# Patient Record
Sex: MALE | Race: WHITE | NOT HISPANIC OR LATINO | Employment: OTHER | URBAN - METROPOLITAN AREA
[De-identification: names, ages, dates, MRNs, and addresses within clinical notes are randomized per-mention and may not be internally consistent; named-entity substitution may affect disease eponyms.]

---

## 2017-07-24 ENCOUNTER — TELEPHONE (OUTPATIENT)
Dept: CARDIOLOGY | Facility: CLINIC | Age: 62
End: 2017-07-24

## 2017-07-24 NOTE — TELEPHONE ENCOUNTER
"Kasia transferred to this RN as she is trying to make a revisit appt for Ray. She states he is having intermittent CP \"for awhile\" +diaphoresis, +fatigue and weakness. States she almost took him to ER yesterday but \"he's stubborn, he didn't want to and we are still paying off his first heart attack.\" \"He's supposed to go lift january today for work\" I advised against this and advised ER for active chest pain is safest route of care for patient. She states he hates going in to any medical appointments and wants all things done at one visit, does not want to keep coming back. I again advised ER for active CP is the best, safest route for patient to make sure he is not in imminent danger. Delay of care may result in death. She states he will refuse but wants to make cardiology clinic appt. I made BRODERICK appointment Wednesday am with KYLE Peña. She states she will bring him to ER if \"he gets too bad\" between now and then. Mary Beck, RN Cardiology at [facility] July 24, 2017, 9:21 AM  "

## 2017-07-26 ENCOUNTER — OFFICE VISIT (OUTPATIENT)
Dept: CARDIOLOGY | Facility: CLINIC | Age: 62
End: 2017-07-26
Payer: COMMERCIAL

## 2017-07-26 DIAGNOSIS — I20.0 UNSTABLE ANGINA PECTORIS (H): ICD-10-CM

## 2017-07-26 DIAGNOSIS — I25.10 CORONARY ARTERY DISEASE INVOLVING NATIVE CORONARY ARTERY OF NATIVE HEART, ANGINA PRESENCE UNSPECIFIED: Primary | ICD-10-CM

## 2017-07-26 PROCEDURE — 99215 OFFICE O/P EST HI 40 MIN: CPT | Performed by: NURSE PRACTITIONER

## 2017-07-26 RX ORDER — ISOSORBIDE MONONITRATE 30 MG/1
30 TABLET, EXTENDED RELEASE ORAL DAILY
Qty: 30 TABLET | Refills: 3 | Status: SHIPPED | OUTPATIENT
Start: 2017-07-26 | End: 2017-07-26

## 2017-07-26 RX ORDER — NITROGLYCERIN 0.4 MG/1
TABLET SUBLINGUAL
Qty: 25 TABLET | Refills: 3 | Status: SHIPPED | OUTPATIENT
Start: 2017-07-26

## 2017-07-26 RX ORDER — NITROGLYCERIN 0.4 MG/1
TABLET SUBLINGUAL
Qty: 25 TABLET | Refills: 3 | Status: SHIPPED | OUTPATIENT
Start: 2017-07-26 | End: 2017-07-26

## 2017-07-26 RX ORDER — ISOSORBIDE MONONITRATE 30 MG/1
30 TABLET, EXTENDED RELEASE ORAL DAILY
Qty: 30 TABLET | Refills: 3 | Status: SHIPPED | OUTPATIENT
Start: 2017-07-26 | End: 2018-04-03

## 2017-07-26 NOTE — PATIENT INSTRUCTIONS
Thank you for your [facility] visit today. Your provider has recommended the following:  Medication Changes:   1. START Imdur (Isosorbide Mononitrate) 30 mg every morning.  2. Use Nitroglycerin under your tongue as needed for chest pain rated 5/10 or greater.  Recommendations:  1. Cardiac Catheterization (angiogram) to be done at [facility] on: Friday, July 28th. Arrive at: 11:00 am.  If you need to contact [facility] for any reason, please call [phone]. Follow the instructions below.  Follow-up:  See Suyapa Nava NP for cardiology follow up in [place], [place] on: Thursday, August 3rd at 10:20 am to discuss the result and your plan of care going forward.  We kindly ask that you call cardiology scheduling at [phone] three months prior to requested revisit date to schedule future cardiology appointments.  Reminder:  1. Please bring in your current medication list or your medication, over the counter supplements and vitamin bottles as we will review these at each office visit.               [facility] HEART [facility]~[address]~[place], [place]~[address]  Questions about your visit today?  Call your Cardiology Clinic RN's-Sowmya Beck and/or Chasity Sanchez at [phone].  ANGIOGRAM  [facility]   [place], [place]   Contact [facility] scheduling if needed at [phone], Option#2 or [phone].      1. In preparation for your procedure, we require that you do the following:    a. Nothing to eat or drink after midnight if your procedure is before 12 noon.  b. Take your usual morning medications with a small sip of water on the day of your procedure unless you are on the following medications:    Aspirin:  o If you currently take Aspirin, continue taking your same dose.    Coumadin or Warfarin:  o Stop Coumadin or Warfarin on:  __________________________    Pradaxa or Xarelto:  o Stop Pradaxa or Xarelto on:   "______________________________    Diabetic:  o If you take Glucophage (metformin) do not take the morning of the procedure or for 2 days after the procedure. Contact your Primary Care MD regarding glucose control for the days you do not take Glucophage.  o If you are on other oral diabetic medications do not take on the morning of the procedure.  o If you take Insulin contact your Primary Care MD for the recommended dosage to take the morning of the procedure.    Diuretic (Water Pill):  o If you take a diuretic (water pill), do not take the morning of the procedure.    c. If you have an allergy to dye, please contact the [facility] office 4 days before your procedure at [phone] option 2, and ask for a nurse.    2. You will be unable to drive after your procedure; please arrange to have someone drive you home the day of your procedure. You will also need to make sure that there is a responsible adult with you for 24 hours after your procedure. Your procedure will be cancelled if you do not have transportation home or someone to stay with you for 24 hrs.     3. Your procedure will be done at [facility]. Please park in the  Skyway Ramp  on the west side of [facility] on [address]. Take the skyway over [facility] to the hospital. Please check in on the first floor, \"Skyway Welcome Desk\" which is one floor down from the skyway level.     4. If you have any questions about your upcoming procedure, please contact the nurse at [facility] at [phone] or the nurse at [facility] at [phone], option#2.      "

## 2017-07-26 NOTE — LETTER
7/26/2017    Elsa Hutchinson MD  [facility]   [address] [place] [address]    RE: Aries Hilario       Dear Colleague,    I had the pleasure of seeing Aries Hilario in the [facility].    Cardiology Clinic Progress Note  Aries Hilario MRN# [id number]   YOB: 1955 Age: 62 year old     Reason for visit: Chest pain            Assessment and Plan:     1. Chest pain, angina in the setting of known coronary artery disease and a previous previous myocardial infarction.    Chest pain symptoms reminiscent of prior to his non-STEMI in September 2016 where he underwent a drug stent for 100% thrombotic occlusion of his mid left circumflex.  Residual disease in the LAD, RCA, diagonals and OM1    Staffed patient with Dr. Shepard and he recommended direct admission to the emergency department and transfer to [facility] for coronary angiography.  Patient was not agreeable to this plan, but was agreeable to undergo an outpatient angiogram later this week.  Informed patient that if rest symptoms return that he present to the emergency department.  Unfortunately, I'm not confident that he will heed this advice.    Started on Imdur 30 mg daily and a prescription for sublingual nitroglycerin provided    2. History of GI bleed due to H pylori and gastric ulcers on dual antiplatelet therapy    He completed the H. pylori elimination therapy    Has resumed dual antiplatelet therapy with aspirin and Plavix without any issue at this time    3. Hyperlipidemia, LDL goal less than 70    Last LDL in October 2016 was 161    Previous memory issues with Crestor    Currently tolerating Lipitor 40 mg daily.         History of Presenting Illness:    Aries Hilario is a 62 year old patient of Dr. Shepard who presents today to [facility] cardiology clinic at the urging of his significant other Kasia.  She called the clinic earlier this week stating that he was having intermittent chest pain  associated diaphoresis, fatigue and weakness.  She was advised that he should be seen in the emergency department, but he adamantly refused. He was agreeable to be seen in clinic today.  They have financial concerns over his previous hospitalization last fall which assist in deterring him from seeking medical care.    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  He states that he's had symptoms of chest discomfort, fatigue and shortness of breath intermittently over the last two months, but on Friday last week his symptoms escalated.  He stated that he became quite diaphoretic with activity and developed left sided chest pain that radiated up to his neck and down his left arm while working outside on a very hot day.  He was able to get into air conditioning, but his diaphoresis and chest discomfort not improve after resting.  He also had associated numbness and tingling in his left arm and hand.  He has became increasingly fatigued over the last several months. His fatigue increased over the weekend, and he had to spend most of the weekend indoors napping specifically after eating.  Fatigue was on it is concerning symptoms prior to his MI in September 2016    Discussed patient with Dr. Shepard who is his primary cardiologist, and he recommended that the patient be sent to the emergency department due for unstable angina.  Discussed this option with the patient and his significant other, Kasia.  He was not agreeable to be seen in the emergency department, but with some urging is willing to proceed with coronary angiography later this week.  Sublingual nitroglycerin as well as Imdur were provided to the patient.    His past medical history is significant for coronary artery disease and a non-STEMI status post coronary angiography on 9/2/2016.  He was found to have a 100% occlusion of his mid left circumflex artery and underwent a drug-eluting stent at that time.  He was placed on  Brillinta and aspirin but due to financial concerns was transitioned to Plavix.  He had residual disease including the proximal LAD having mild luminal irregularities, mid LAD 30% stenosis proximal D2, distal LAD mild diffuse disease, D1 had 40% ostial stenosis followed by mild disease in D2 had a 40% midsegment stenosis.  There is also a 80% stenosis of the OM1 in the ostial and proximal segments and mid RCA had 30% stenosis.      He also has a past medical history significant for hyperlipidemia, H. pylori, GI bleeding on dual anti platelet therapy secondary to H. pylori and gastric ulcers, mild traumatic brain injury, peripheral neuropathy, anxiety, GERD and hepatitis C in remission.    Unfortunately, in October he was admitted to [facility] for GI bleed.  Endoscopy will demonstrated multiple gastric ulcers.  He was placed on PPI therapy and his aspirin was transiently held.  He has been resumed on dual antiplatelet therapy.  He previously attributed memory loss to his statin therapy and discontinued previously.  He was previously on rosuvastatin and now is on atorvastatin which he is tolerating.  Recent benefits of coronary angiography were discussed with the patient in detail today.    Greater than 45 minutes was spent with this patient today on counseling and discussion.          This note was completed in part using Dragon voice recognition software. Although reviewed after completion, some word and grammatical errors may occur       Review of Systems:   See history of present illness.  Was unable to be obtained by the remaining staff due to patient's unwillingness to cooperate.         Physical Exam:     Vitals: There were no vitals taken for this visit. Blood pressure was 122/88  Constitutional:  cooperative, alert and oriented, well developed, well nourished, in no acute distress        Skin:  warm and dry to the touch        Head:  normocephalic        Eyes:  pupils equal and round;conjunctivae and  lids unremarkable        ENT:  no pallor or cyanosis        Chest:  clear to auscultation;normal symmetry        Cardiac: regular rhythm;normal S1 and S2;no murmurs, gallops or rubs detected                  Abdomen:  abdomen soft obese      Extremities and Back:  no edema        Neurological:  no gross motor deficits;affect appropriate                 Medications:     Current Outpatient Prescriptions   Medication Sig Dispense Refill     nitroGLYcerin (NITROSTAT) 0.4 MG sublingual tablet For chest pain place 1 tablet under the tongue every 5 minutes for 3 doses. If symptoms persist 5 minutes after 1st dose call 911. 25 tablet 3     isosorbide mononitrate (IMDUR) 30 MG 24 hr tablet Take 1 tablet (30 mg) by mouth daily 30 tablet 3     atorvastatin (LIPITOR) 40 MG tablet Take 1 tablet (40 mg) by mouth daily 90 tablet 3     Ferrous Sulfate (IRON SUPPLEMENT PO) Take 325 mg by mouth daily       Probiotic Product (PROBIOTIC ADVANCED PO)        hydrocortisone (ANUSOL-HC) 2.5 % rectal cream Place rectally 2 times daily For up to one week 28.35 g 0     aspirin 81 MG tablet Take 1 tablet (81 mg) by mouth daily 30 tablet 11     oxybutynin (DITROPAN) 5 MG tablet Take 1-2 tablets (5-10 mg) by mouth At Bedtime 60 tablet 3     buPROPion (WELLBUTRIN) 100 MG tablet Take 0.75 tablets (75 mg) by mouth 2 times daily (Patient taking differently: Take 50 mg by mouth daily ) 90 tablet 0     clopidogrel (PLAVIX) 75 MG tablet Take 1 tablet (75 mg) by mouth daily 90 tablet 3     metoprolol (TOPROL-XL) 25 MG 24 hr tablet Take 1/2 tablet by mouth daily. 45 tablet 3     [DISCONTINUED] nitroGLYcerin (NITROSTAT) 0.4 MG sublingual tablet For chest pain place 1 tablet under the tongue every 5 minutes for 3 doses. If symptoms persist 5 minutes after 1st dose call 911. 25 tablet 3     [DISCONTINUED] isosorbide mononitrate (IMDUR) 30 MG 24 hr tablet Take 1 tablet (30 mg) by mouth daily 30 tablet 3           Family History   Problem Relation Age of  Onset     Asthma No family hx of      DIABETES No family hx of      Hypertension No family hx of      CEREBROVASCULAR DISEASE No family hx of      Cancer - colorectal No family hx of      Prostate Cancer No family hx of      Depression Father      suicide     Coronary Artery Disease Brother      MI in his 60s       Social History     Social History     Marital status:      Spouse name: N/A     Number of children: N/A     Years of education: N/A     Occupational History     Not on file.     Social History Main Topics     Smoking status: Former Smoker     Quit date: 1/1/2005     Smokeless tobacco: Never Used     Alcohol use No     Drug use: No     Sexual activity: Yes     Partners: Female     Other Topics Concern     Parent/Sibling W/ Cabg, Mi Or Angioplasty Before 65f 55m? No     Social History Narrative            Past Medical History:     Past Medical History:   Diagnosis Date     Depression with anxiety      Hepatitis C      Peripheral neuropathy (H)               Past Surgical History:     Past Surgical History:   Procedure Laterality Date     COLONOSCOPY N/A 9/29/2014    Procedure: COLONOSCOPY;  Surgeon: Néstor Steele MD;  Location: [facility]     HAND SURGERY       LAPAROSCOPIC HERNIORRHAPHY INGUINAL BILATERAL Bilateral 12/1/2014    Procedure: LAPAROSCOPIC HERNIORRHAPHY INGUINAL BILATERAL;  Surgeon: Navid Almendarez MD;  Location: [facility]              Allergies:   Penicillins       Data:   All laboratory data reviewed:    LAST CHOLESTEROL:  Lab Results   Component Value Date    CHOL 222 12/19/2016     Lab Results   Component Value Date    HDL 42 12/19/2016     Lab Results   Component Value Date     12/19/2016     Lab Results   Component Value Date    TRIG 97 12/19/2016     Lab Results   Component Value Date    CHOLHDLRATIO 5.4 11/21/2014       LAST BMP:  Lab Results   Component Value Date     10/17/2016      Lab Results   Component Value Date    POTASSIUM 3.9 10/17/2016     Lab Results    Component Value Date    CHLORIDE 104 10/17/2016     Lab Results   Component Value Date    TYREE 8.6 10/17/2016     Lab Results   Component Value Date    CO2 27 10/17/2016     Lab Results   Component Value Date    BUN 17 10/17/2016     Lab Results   Component Value Date    CR 1.16 10/17/2016     Lab Results   Component Value Date     10/17/2016       LAST CBC:  Lab Results   Component Value Date    WBC 8.0 10/17/2016     Lab Results   Component Value Date    RBC 3.49 10/17/2016     Lab Results   Component Value Date    HGB 10.7 10/17/2016     Lab Results   Component Value Date    HCT 32.0 10/17/2016     Lab Results   Component Value Date    MCV 92 10/17/2016     Lab Results   Component Value Date    MCH 30.7 10/17/2016     Lab Results   Component Value Date    MCHC 33.4 10/17/2016     Lab Results   Component Value Date    RDW 12.9 10/17/2016     Lab Results   Component Value Date     10/17/2016     Thank you for allowing me to participate in the care of your patient.    Sincerely,     MERCY CORDOBA [facility]

## 2017-07-26 NOTE — PROGRESS NOTES
Cardiology Clinic Progress Note  Aries Hilario MRN# [id number]   YOB: 1955 Age: 62 year old     Reason for visit: Chest pain            Assessment and Plan:     1. Chest pain, angina in the setting of known coronary artery disease and a previous previous myocardial infarction.    Chest pain symptoms reminiscent of prior to his non-STEMI in September 2016 where he underwent a drug stent for 100% thrombotic occlusion of his mid left circumflex.  Residual disease in the LAD, RCA, diagonals and OM1    Staffed patient with Dr. Shepard and he recommended direct admission to the emergency department and transfer to [facility] for coronary angiography.  Patient was not agreeable to this plan, but was agreeable to undergo an outpatient angiogram later this week.  Informed patient that if rest symptoms return that he present to the emergency department.  Unfortunately, I'm not confident that he will heed this advice.    Started on Imdur 30 mg daily and a prescription for sublingual nitroglycerin provided    2. History of GI bleed due to H pylori and gastric ulcers on dual antiplatelet therapy    He completed the H. pylori elimination therapy    Has resumed dual antiplatelet therapy with aspirin and Plavix without any issue at this time    3. Hyperlipidemia, LDL goal less than 70    Last LDL in October 2016 was 161    Previous memory issues with Crestor    Currently tolerating Lipitor 40 mg daily.         History of Presenting Illness:    Aries Hilario is a 62 year old patient of Dr. Shepard who presents today to [facility] cardiology clinic at the urging of his significant other Kasia.  She called the clinic earlier this week stating that he was having intermittent chest pain associated diaphoresis, fatigue and weakness.  She was advised that he should be seen in the emergency department, but he adamantly refused. He was agreeable to be seen in clinic today.  They have financial concerns over his previous  hospitalization last fall which assist in deterring him from seeking medical care.    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  He states that he's had symptoms of chest discomfort, fatigue and shortness of breath intermittently over the last two months, but on Friday last week his symptoms escalated.  He stated that he became quite diaphoretic with activity and developed left sided chest pain that radiated up to his neck and down his left arm while working outside on a very hot day.  He was able to get into air conditioning, but his diaphoresis and chest discomfort not improve after resting.  He also had associated numbness and tingling in his left arm and hand.  He has became increasingly fatigued over the last several months. His fatigue increased over the weekend, and he had to spend most of the weekend indoors napping specifically after eating.  Fatigue was on it is concerning symptoms prior to his MI in September 2016    Discussed patient with Dr. Shepard who is his primary cardiologist, and he recommended that the patient be sent to the emergency department due for unstable angina.  Discussed this option with the patient and his significant other, Kasia.  He was not agreeable to be seen in the emergency department, but with some urging is willing to proceed with coronary angiography later this week.  Sublingual nitroglycerin as well as Imdur were provided to the patient.    His past medical history is significant for coronary artery disease and a non-STEMI status post coronary angiography on 9/2/2016.  He was found to have a 100% occlusion of his mid left circumflex artery and underwent a drug-eluting stent at that time.  He was placed on Brillinta and aspirin but due to financial concerns was transitioned to Plavix.  He had residual disease including the proximal LAD having mild luminal irregularities, mid LAD 30% stenosis proximal D2, distal LAD mild diffuse disease,  D1 had 40% ostial stenosis followed by mild disease in D2 had a 40% midsegment stenosis.  There is also a 80% stenosis of the OM1 in the ostial and proximal segments and mid RCA had 30% stenosis.      He also has a past medical history significant for hyperlipidemia, H. pylori, GI bleeding on dual anti platelet therapy secondary to H. pylori and gastric ulcers, mild traumatic brain injury, peripheral neuropathy, anxiety, GERD and hepatitis C in remission.    Unfortunately, in October he was admitted to [facility] for GI bleed.  Endoscopy will demonstrated multiple gastric ulcers.  He was placed on PPI therapy and his aspirin was transiently held.  He has been resumed on dual antiplatelet therapy.  He previously attributed memory loss to his statin therapy and discontinued previously.  He was previously on rosuvastatin and now is on atorvastatin which he is tolerating.  Recent benefits of coronary angiography were discussed with the patient in detail today.    Greater than 45 minutes was spent with this patient today on counseling and discussion.          This note was completed in part using Dragon voice recognition software. Although reviewed after completion, some word and grammatical errors may occur       Review of Systems:   See history of present illness.  Was unable to be obtained by the remaining staff due to patient's unwillingness to cooperate.         Physical Exam:     Vitals: There were no vitals taken for this visit. Blood pressure was 122/88  Constitutional:  cooperative, alert and oriented, well developed, well nourished, in no acute distress        Skin:  warm and dry to the touch        Head:  normocephalic        Eyes:  pupils equal and round;conjunctivae and lids unremarkable        ENT:  no pallor or cyanosis        Chest:  clear to auscultation;normal symmetry        Cardiac: regular rhythm;normal S1 and S2;no murmurs, gallops or rubs detected                  Abdomen:  abdomen soft  obese      Extremities and Back:  no edema        Neurological:  no gross motor deficits;affect appropriate                 Medications:     Current Outpatient Prescriptions   Medication Sig Dispense Refill     nitroGLYcerin (NITROSTAT) 0.4 MG sublingual tablet For chest pain place 1 tablet under the tongue every 5 minutes for 3 doses. If symptoms persist 5 minutes after 1st dose call 911. 25 tablet 3     isosorbide mononitrate (IMDUR) 30 MG 24 hr tablet Take 1 tablet (30 mg) by mouth daily 30 tablet 3     atorvastatin (LIPITOR) 40 MG tablet Take 1 tablet (40 mg) by mouth daily 90 tablet 3     Ferrous Sulfate (IRON SUPPLEMENT PO) Take 325 mg by mouth daily       Probiotic Product (PROBIOTIC ADVANCED PO)        hydrocortisone (ANUSOL-HC) 2.5 % rectal cream Place rectally 2 times daily For up to one week 28.35 g 0     aspirin 81 MG tablet Take 1 tablet (81 mg) by mouth daily 30 tablet 11     oxybutynin (DITROPAN) 5 MG tablet Take 1-2 tablets (5-10 mg) by mouth At Bedtime 60 tablet 3     buPROPion (WELLBUTRIN) 100 MG tablet Take 0.75 tablets (75 mg) by mouth 2 times daily (Patient taking differently: Take 50 mg by mouth daily ) 90 tablet 0     clopidogrel (PLAVIX) 75 MG tablet Take 1 tablet (75 mg) by mouth daily 90 tablet 3     metoprolol (TOPROL-XL) 25 MG 24 hr tablet Take 1/2 tablet by mouth daily. 45 tablet 3     [DISCONTINUED] nitroGLYcerin (NITROSTAT) 0.4 MG sublingual tablet For chest pain place 1 tablet under the tongue every 5 minutes for 3 doses. If symptoms persist 5 minutes after 1st dose call 911. 25 tablet 3     [DISCONTINUED] isosorbide mononitrate (IMDUR) 30 MG 24 hr tablet Take 1 tablet (30 mg) by mouth daily 30 tablet 3           Family History   Problem Relation Age of Onset     Asthma No family hx of      DIABETES No family hx of      Hypertension No family hx of      CEREBROVASCULAR DISEASE No family hx of      Cancer - colorectal No family hx of      Prostate Cancer No family hx of      Depression  Father      suicide     Coronary Artery Disease Brother      MI in his 60s       Social History     Social History     Marital status:      Spouse name: N/A     Number of children: N/A     Years of education: N/A     Occupational History     Not on file.     Social History Main Topics     Smoking status: Former Smoker     Quit date: 1/1/2005     Smokeless tobacco: Never Used     Alcohol use No     Drug use: No     Sexual activity: Yes     Partners: Female     Other Topics Concern     Parent/Sibling W/ Cabg, Mi Or Angioplasty Before 65f 55m? No     Social History Narrative            Past Medical History:     Past Medical History:   Diagnosis Date     Depression with anxiety      Hepatitis C      Peripheral neuropathy (H)               Past Surgical History:     Past Surgical History:   Procedure Laterality Date     COLONOSCOPY N/A 9/29/2014    Procedure: COLONOSCOPY;  Surgeon: Néstor Steele MD;  Location: [facility]     HAND SURGERY       LAPAROSCOPIC HERNIORRHAPHY INGUINAL BILATERAL Bilateral 12/1/2014    Procedure: LAPAROSCOPIC HERNIORRHAPHY INGUINAL BILATERAL;  Surgeon: Navid Almendarez MD;  Location: [facility]              Allergies:   Penicillins       Data:   All laboratory data reviewed:    LAST CHOLESTEROL:  Lab Results   Component Value Date    CHOL 222 12/19/2016     Lab Results   Component Value Date    HDL 42 12/19/2016     Lab Results   Component Value Date     12/19/2016     Lab Results   Component Value Date    TRIG 97 12/19/2016     Lab Results   Component Value Date    CHOLHDLRATIO 5.4 11/21/2014       LAST BMP:  Lab Results   Component Value Date     10/17/2016      Lab Results   Component Value Date    POTASSIUM 3.9 10/17/2016     Lab Results   Component Value Date    CHLORIDE 104 10/17/2016     Lab Results   Component Value Date    TYREE 8.6 10/17/2016     Lab Results   Component Value Date    CO2 27 10/17/2016     Lab Results   Component Value Date    BUN 17 10/17/2016     Lab  Results   Component Value Date    CR 1.16 10/17/2016     Lab Results   Component Value Date     10/17/2016       LAST CBC:  Lab Results   Component Value Date    WBC 8.0 10/17/2016     Lab Results   Component Value Date    RBC 3.49 10/17/2016     Lab Results   Component Value Date    HGB 10.7 10/17/2016     Lab Results   Component Value Date    HCT 32.0 10/17/2016     Lab Results   Component Value Date    MCV 92 10/17/2016     Lab Results   Component Value Date    MCH 30.7 10/17/2016     Lab Results   Component Value Date    MCHC 33.4 10/17/2016     Lab Results   Component Value Date    RDW 12.9 10/17/2016     Lab Results   Component Value Date     10/17/2016         MARCUS HAYES, [facility], CNP

## 2017-07-27 RX ORDER — LIDOCAINE 40 MG/G
CREAM TOPICAL
Status: CANCELLED | OUTPATIENT
Start: 2017-07-27

## 2017-07-27 RX ORDER — SODIUM CHLORIDE 9 MG/ML
INJECTION, SOLUTION INTRAVENOUS CONTINUOUS
Status: CANCELLED | OUTPATIENT
Start: 2017-07-27

## 2017-07-27 RX ORDER — ASPIRIN 81 MG/1
81 TABLET ORAL DAILY
Status: CANCELLED | OUTPATIENT
Start: 2017-07-27

## 2017-07-28 ENCOUNTER — APPOINTMENT (OUTPATIENT)
Dept: CARDIOLOGY | Facility: CLINIC | Age: 62
End: 2017-07-28
Attending: NURSE PRACTITIONER
Payer: COMMERCIAL

## 2017-07-28 ENCOUNTER — HOSPITAL ENCOUNTER (OUTPATIENT)
Facility: CLINIC | Age: 62
Discharge: HOME | End: 2017-07-28
Attending: INTERNAL MEDICINE | Admitting: INTERNAL MEDICINE
Payer: COMMERCIAL

## 2017-07-28 VITALS
WEIGHT: 259 LBS | OXYGEN SATURATION: 94 % | TEMPERATURE: 97.6 F | SYSTOLIC BLOOD PRESSURE: 152 MMHG | BODY MASS INDEX: 35.13 KG/M2 | RESPIRATION RATE: 16 BRPM | DIASTOLIC BLOOD PRESSURE: 100 MMHG | HEART RATE: 57 BPM

## 2017-07-28 DIAGNOSIS — I25.10 CORONARY ARTERY DISEASE INVOLVING NATIVE CORONARY ARTERY OF NATIVE HEART, ANGINA PRESENCE UNSPECIFIED: ICD-10-CM

## 2017-07-28 LAB
ANION GAP SERPL CALCULATED.3IONS-SCNC: 6 MMOL/L (ref 3–14)
APTT PPP: 29 SEC (ref 22–37)
BUN SERPL-MCNC: 18 MG/DL (ref 7–30)
CALCIUM SERPL-MCNC: 8.8 MG/DL (ref 8.5–10.1)
CHLORIDE SERPL-SCNC: 105 MMOL/L (ref 94–109)
CO2 SERPL-SCNC: 28 MMOL/L (ref 20–32)
CREAT SERPL-MCNC: 0.96 MG/DL (ref 0.66–1.25)
ERYTHROCYTE [DISTWIDTH] IN BLOOD BY AUTOMATED COUNT: 12.8 % (ref 10–15)
GFR SERPL CREATININE-BSD FRML MDRD: 80 ML/MIN/1.7M2
GLUCOSE SERPL-MCNC: 101 MG/DL (ref 70–99)
HCT VFR BLD AUTO: 47.3 % (ref 40–53)
HGB BLD-MCNC: 16.4 G/DL (ref 13.3–17.7)
INR PPP: 0.93 (ref 0.86–1.14)
MCH RBC QN AUTO: 31.2 PG (ref 26.5–33)
MCHC RBC AUTO-ENTMCNC: 34.7 G/DL (ref 31.5–36.5)
MCV RBC AUTO: 90 FL (ref 78–100)
PLATELET # BLD AUTO: 197 10E9/L (ref 150–450)
POTASSIUM SERPL-SCNC: 4 MMOL/L (ref 3.4–5.3)
RBC # BLD AUTO: 5.26 10E12/L (ref 4.4–5.9)
SODIUM SERPL-SCNC: 139 MMOL/L (ref 133–144)
WBC # BLD AUTO: 8.5 10E9/L (ref 4–11)

## 2017-07-28 PROCEDURE — 93458 L HRT ARTERY/VENTRICLE ANGIO: CPT | Mod: 26 | Performed by: INTERNAL MEDICINE

## 2017-07-28 PROCEDURE — 40000065 ZZH STATISTIC EKG NON-CHARGEABLE

## 2017-07-28 PROCEDURE — 93458 L HRT ARTERY/VENTRICLE ANGIO: CPT

## 2017-07-28 PROCEDURE — 99153 MOD SED SAME PHYS/QHP EA: CPT

## 2017-07-28 PROCEDURE — 93010 ELECTROCARDIOGRAM REPORT: CPT | Performed by: INTERNAL MEDICINE

## 2017-07-28 PROCEDURE — 85730 THROMBOPLASTIN TIME PARTIAL: CPT | Performed by: NURSE PRACTITIONER

## 2017-07-28 PROCEDURE — 25000125 ZZHC RX 250: Performed by: INTERNAL MEDICINE

## 2017-07-28 PROCEDURE — 27210910 ZZH NEEDLE CR6

## 2017-07-28 PROCEDURE — 40000852 ZZH STATISTIC HEART CATH LAB OR EP LAB

## 2017-07-28 PROCEDURE — B2111ZZ FLUOROSCOPY OF MULTIPLE CORONARY ARTERIES USING LOW OSMOLAR CONTRAST: ICD-10-PCS | Performed by: INTERNAL MEDICINE

## 2017-07-28 PROCEDURE — 80048 BASIC METABOLIC PNL TOTAL CA: CPT | Performed by: NURSE PRACTITIONER

## 2017-07-28 PROCEDURE — 27210787 ZZH MANIFOLD CR2

## 2017-07-28 PROCEDURE — 85027 COMPLETE CBC AUTOMATED: CPT | Performed by: NURSE PRACTITIONER

## 2017-07-28 PROCEDURE — 25000128 H RX IP 250 OP 636: Performed by: INTERNAL MEDICINE

## 2017-07-28 PROCEDURE — 27210795 ZZH PAD DEFIB QUICK CR4

## 2017-07-28 PROCEDURE — 27210946 ZZH KIT HC TOTES DISP CR8

## 2017-07-28 PROCEDURE — 85610 PROTHROMBIN TIME: CPT | Performed by: NURSE PRACTITIONER

## 2017-07-28 PROCEDURE — 25000128 H RX IP 250 OP 636: Performed by: NURSE PRACTITIONER

## 2017-07-28 PROCEDURE — 25000132 ZZH RX MED GY IP 250 OP 250 PS 637: Performed by: INTERNAL MEDICINE

## 2017-07-28 PROCEDURE — 27211089 ZZH KIT ACIST INJECTOR CR3

## 2017-07-28 PROCEDURE — 4A023N7 MEASUREMENT OF CARDIAC SAMPLING AND PRESSURE, LEFT HEART, PERCUTANEOUS APPROACH: ICD-10-PCS | Performed by: INTERNAL MEDICINE

## 2017-07-28 PROCEDURE — 99152 MOD SED SAME PHYS/QHP 5/>YRS: CPT | Performed by: INTERNAL MEDICINE

## 2017-07-28 PROCEDURE — 99152 MOD SED SAME PHYS/QHP 5/>YRS: CPT

## 2017-07-28 PROCEDURE — 93005 ELECTROCARDIOGRAM TRACING: CPT

## 2017-07-28 RX ORDER — ASPIRIN 81 MG/1
81 TABLET ORAL DAILY
Status: DISCONTINUED | OUTPATIENT
Start: 2017-07-28 | End: 2017-07-28 | Stop reason: HOSPADM

## 2017-07-28 RX ORDER — ATROPINE SULFATE 0.1 MG/ML
.5-1 INJECTION INTRAVENOUS
Status: DISCONTINUED | OUTPATIENT
Start: 2017-07-28 | End: 2017-07-28 | Stop reason: HOSPADM

## 2017-07-28 RX ORDER — FLUMAZENIL 0.1 MG/ML
0.2 INJECTION, SOLUTION INTRAVENOUS
Status: DISCONTINUED | OUTPATIENT
Start: 2017-07-28 | End: 2017-07-28 | Stop reason: HOSPADM

## 2017-07-28 RX ORDER — LIDOCAINE HYDROCHLORIDE 10 MG/ML
1-10 INJECTION, SOLUTION EPIDURAL; INFILTRATION; INTRACAUDAL; PERINEURAL
Status: COMPLETED | OUTPATIENT
Start: 2017-07-28 | End: 2017-07-28

## 2017-07-28 RX ORDER — SODIUM CHLORIDE 9 MG/ML
INJECTION, SOLUTION INTRAVENOUS CONTINUOUS
Status: DISCONTINUED | OUTPATIENT
Start: 2017-07-28 | End: 2017-07-28 | Stop reason: HOSPADM

## 2017-07-28 RX ORDER — PROMETHAZINE HYDROCHLORIDE 25 MG/ML
6.25-25 INJECTION, SOLUTION INTRAMUSCULAR; INTRAVENOUS EVERY 4 HOURS PRN
Status: DISCONTINUED | OUTPATIENT
Start: 2017-07-28 | End: 2017-07-28 | Stop reason: HOSPADM

## 2017-07-28 RX ORDER — CLOPIDOGREL BISULFATE 75 MG/1
75 TABLET ORAL
Status: DISCONTINUED | OUTPATIENT
Start: 2017-07-28 | End: 2017-07-28 | Stop reason: HOSPADM

## 2017-07-28 RX ORDER — ATROPINE SULFATE 0.1 MG/ML
0.5 INJECTION INTRAVENOUS EVERY 5 MIN PRN
Status: DISCONTINUED | OUTPATIENT
Start: 2017-07-28 | End: 2017-07-28 | Stop reason: HOSPADM

## 2017-07-28 RX ORDER — DOPAMINE HYDROCHLORIDE 160 MG/100ML
2-20 INJECTION, SOLUTION INTRAVENOUS CONTINUOUS PRN
Status: DISCONTINUED | OUTPATIENT
Start: 2017-07-28 | End: 2017-07-28 | Stop reason: HOSPADM

## 2017-07-28 RX ORDER — PRASUGREL 10 MG/1
10-60 TABLET, FILM COATED ORAL
Status: DISCONTINUED | OUTPATIENT
Start: 2017-07-28 | End: 2017-07-28 | Stop reason: HOSPADM

## 2017-07-28 RX ORDER — PHENYLEPHRINE HCL IN 0.9% NACL 1 MG/10 ML
20-100 SYRINGE (ML) INTRAVENOUS
Status: DISCONTINUED | OUTPATIENT
Start: 2017-07-28 | End: 2017-07-28 | Stop reason: HOSPADM

## 2017-07-28 RX ORDER — NALOXONE HYDROCHLORIDE 0.4 MG/ML
0.4 INJECTION, SOLUTION INTRAMUSCULAR; INTRAVENOUS; SUBCUTANEOUS EVERY 5 MIN PRN
Status: DISCONTINUED | OUTPATIENT
Start: 2017-07-28 | End: 2017-07-28 | Stop reason: HOSPADM

## 2017-07-28 RX ORDER — FENTANYL CITRATE 50 UG/ML
25-50 INJECTION, SOLUTION INTRAMUSCULAR; INTRAVENOUS
Status: DISCONTINUED | OUTPATIENT
Start: 2017-07-28 | End: 2017-07-28 | Stop reason: HOSPADM

## 2017-07-28 RX ORDER — ENALAPRILAT 1.25 MG/ML
1.25-2.5 INJECTION INTRAVENOUS
Status: DISCONTINUED | OUTPATIENT
Start: 2017-07-28 | End: 2017-07-28 | Stop reason: HOSPADM

## 2017-07-28 RX ORDER — POTASSIUM CHLORIDE 29.8 MG/ML
20 INJECTION INTRAVENOUS
Status: DISCONTINUED | OUTPATIENT
Start: 2017-07-28 | End: 2017-07-28 | Stop reason: HOSPADM

## 2017-07-28 RX ORDER — POTASSIUM CHLORIDE 7.45 MG/ML
10 INJECTION INTRAVENOUS
Status: DISCONTINUED | OUTPATIENT
Start: 2017-07-28 | End: 2017-07-28 | Stop reason: HOSPADM

## 2017-07-28 RX ORDER — DIPHENHYDRAMINE HYDROCHLORIDE 50 MG/ML
25-50 INJECTION INTRAMUSCULAR; INTRAVENOUS
Status: DISCONTINUED | OUTPATIENT
Start: 2017-07-28 | End: 2017-07-28 | Stop reason: HOSPADM

## 2017-07-28 RX ORDER — CLOPIDOGREL BISULFATE 75 MG/1
300-600 TABLET ORAL
Status: DISCONTINUED | OUTPATIENT
Start: 2017-07-28 | End: 2017-07-28 | Stop reason: HOSPADM

## 2017-07-28 RX ORDER — IOPAMIDOL 755 MG/ML
62 INJECTION, SOLUTION INTRAVASCULAR ONCE
Status: COMPLETED | OUTPATIENT
Start: 2017-07-28 | End: 2017-07-28

## 2017-07-28 RX ORDER — EPTIFIBATIDE 2 MG/ML
2 INJECTION, SOLUTION INTRAVENOUS CONTINUOUS PRN
Status: DISCONTINUED | OUTPATIENT
Start: 2017-07-28 | End: 2017-07-28 | Stop reason: HOSPADM

## 2017-07-28 RX ORDER — FUROSEMIDE 10 MG/ML
20-100 INJECTION INTRAMUSCULAR; INTRAVENOUS
Status: DISCONTINUED | OUTPATIENT
Start: 2017-07-28 | End: 2017-07-28 | Stop reason: HOSPADM

## 2017-07-28 RX ORDER — DEXTROSE MONOHYDRATE 25 G/50ML
12.5-5 INJECTION, SOLUTION INTRAVENOUS EVERY 30 MIN PRN
Status: DISCONTINUED | OUTPATIENT
Start: 2017-07-28 | End: 2017-07-28 | Stop reason: HOSPADM

## 2017-07-28 RX ORDER — NALOXONE HYDROCHLORIDE 0.4 MG/ML
.1-.4 INJECTION, SOLUTION INTRAMUSCULAR; INTRAVENOUS; SUBCUTANEOUS
Status: DISCONTINUED | OUTPATIENT
Start: 2017-07-28 | End: 2017-07-28 | Stop reason: HOSPADM

## 2017-07-28 RX ORDER — ACETAMINOPHEN 325 MG/1
325-650 TABLET ORAL EVERY 4 HOURS PRN
Status: DISCONTINUED | OUTPATIENT
Start: 2017-07-28 | End: 2017-07-28 | Stop reason: HOSPADM

## 2017-07-28 RX ORDER — NICARDIPINE HYDROCHLORIDE 2.5 MG/ML
100 INJECTION INTRAVENOUS
Status: DISCONTINUED | OUTPATIENT
Start: 2017-07-28 | End: 2017-07-28 | Stop reason: HOSPADM

## 2017-07-28 RX ORDER — SODIUM NITROPRUSSIDE 25 MG/ML
100-200 INJECTION INTRAVENOUS
Status: DISCONTINUED | OUTPATIENT
Start: 2017-07-28 | End: 2017-07-28 | Stop reason: HOSPADM

## 2017-07-28 RX ORDER — EPTIFIBATIDE 2 MG/ML
180 INJECTION, SOLUTION INTRAVENOUS EVERY 10 MIN PRN
Status: DISCONTINUED | OUTPATIENT
Start: 2017-07-28 | End: 2017-07-28 | Stop reason: HOSPADM

## 2017-07-28 RX ORDER — MORPHINE SULFATE 2 MG/ML
1-2 INJECTION, SOLUTION INTRAMUSCULAR; INTRAVENOUS EVERY 5 MIN PRN
Status: DISCONTINUED | OUTPATIENT
Start: 2017-07-28 | End: 2017-07-28 | Stop reason: HOSPADM

## 2017-07-28 RX ORDER — HYDROCODONE BITARTRATE AND ACETAMINOPHEN 5; 325 MG/1; MG/1
1-2 TABLET ORAL EVERY 4 HOURS PRN
Status: DISCONTINUED | OUTPATIENT
Start: 2017-07-28 | End: 2017-07-28 | Stop reason: HOSPADM

## 2017-07-28 RX ORDER — HEPARIN SODIUM 1000 [USP'U]/ML
1000-10000 INJECTION, SOLUTION INTRAVENOUS; SUBCUTANEOUS EVERY 5 MIN PRN
Status: DISCONTINUED | OUTPATIENT
Start: 2017-07-28 | End: 2017-07-28 | Stop reason: HOSPADM

## 2017-07-28 RX ORDER — LORAZEPAM 2 MG/ML
.5-2 INJECTION INTRAMUSCULAR EVERY 4 HOURS PRN
Status: DISCONTINUED | OUTPATIENT
Start: 2017-07-28 | End: 2017-07-28 | Stop reason: HOSPADM

## 2017-07-28 RX ORDER — LIDOCAINE HYDROCHLORIDE 10 MG/ML
30 INJECTION, SOLUTION EPIDURAL; INFILTRATION; INTRACAUDAL; PERINEURAL
Status: DISCONTINUED | OUTPATIENT
Start: 2017-07-28 | End: 2017-07-28 | Stop reason: HOSPADM

## 2017-07-28 RX ORDER — NITROGLYCERIN 0.4 MG/1
0.4 TABLET SUBLINGUAL EVERY 5 MIN PRN
Status: DISCONTINUED | OUTPATIENT
Start: 2017-07-28 | End: 2017-07-28 | Stop reason: HOSPADM

## 2017-07-28 RX ORDER — ASPIRIN 81 MG/1
81-324 TABLET, CHEWABLE ORAL
Status: DISCONTINUED | OUTPATIENT
Start: 2017-07-28 | End: 2017-07-28 | Stop reason: HOSPADM

## 2017-07-28 RX ORDER — METHYLPREDNISOLONE SODIUM SUCCINATE 125 MG/2ML
125 INJECTION, POWDER, LYOPHILIZED, FOR SOLUTION INTRAMUSCULAR; INTRAVENOUS
Status: DISCONTINUED | OUTPATIENT
Start: 2017-07-28 | End: 2017-07-28 | Stop reason: HOSPADM

## 2017-07-28 RX ORDER — LIDOCAINE 40 MG/G
CREAM TOPICAL
Status: DISCONTINUED | OUTPATIENT
Start: 2017-07-28 | End: 2017-07-28 | Stop reason: HOSPADM

## 2017-07-28 RX ORDER — NALOXONE HYDROCHLORIDE 0.4 MG/ML
.2-.4 INJECTION, SOLUTION INTRAMUSCULAR; INTRAVENOUS; SUBCUTANEOUS
Status: DISCONTINUED | OUTPATIENT
Start: 2017-07-28 | End: 2017-07-28 | Stop reason: HOSPADM

## 2017-07-28 RX ORDER — ADENOSINE 3 MG/ML
12-12000 INJECTION, SOLUTION INTRAVENOUS
Status: DISCONTINUED | OUTPATIENT
Start: 2017-07-28 | End: 2017-07-28 | Stop reason: HOSPADM

## 2017-07-28 RX ORDER — NITROGLYCERIN 5 MG/ML
100-200 VIAL (ML) INTRAVENOUS
Status: DISCONTINUED | OUTPATIENT
Start: 2017-07-28 | End: 2017-07-28 | Stop reason: HOSPADM

## 2017-07-28 RX ORDER — PROTAMINE SULFATE 10 MG/ML
1-5 INJECTION, SOLUTION INTRAVENOUS
Status: DISCONTINUED | OUTPATIENT
Start: 2017-07-28 | End: 2017-07-28 | Stop reason: HOSPADM

## 2017-07-28 RX ORDER — NIFEDIPINE 10 MG/1
10 CAPSULE ORAL
Status: DISCONTINUED | OUTPATIENT
Start: 2017-07-28 | End: 2017-07-28 | Stop reason: HOSPADM

## 2017-07-28 RX ORDER — ONDANSETRON 2 MG/ML
4 INJECTION INTRAMUSCULAR; INTRAVENOUS EVERY 4 HOURS PRN
Status: DISCONTINUED | OUTPATIENT
Start: 2017-07-28 | End: 2017-07-28 | Stop reason: HOSPADM

## 2017-07-28 RX ORDER — ASPIRIN 325 MG
325 TABLET ORAL
Status: DISCONTINUED | OUTPATIENT
Start: 2017-07-28 | End: 2017-07-28 | Stop reason: HOSPADM

## 2017-07-28 RX ORDER — NITROGLYCERIN 20 MG/100ML
.07-1.7 INJECTION INTRAVENOUS CONTINUOUS PRN
Status: DISCONTINUED | OUTPATIENT
Start: 2017-07-28 | End: 2017-07-28 | Stop reason: HOSPADM

## 2017-07-28 RX ORDER — HYDRALAZINE HYDROCHLORIDE 20 MG/ML
10-20 INJECTION INTRAMUSCULAR; INTRAVENOUS
Status: DISCONTINUED | OUTPATIENT
Start: 2017-07-28 | End: 2017-07-28 | Stop reason: HOSPADM

## 2017-07-28 RX ORDER — BUPIVACAINE HYDROCHLORIDE 2.5 MG/ML
1-10 INJECTION, SOLUTION EPIDURAL; INFILTRATION; INTRACAUDAL
Status: DISCONTINUED | OUTPATIENT
Start: 2017-07-28 | End: 2017-07-28 | Stop reason: HOSPADM

## 2017-07-28 RX ORDER — METOPROLOL TARTRATE 1 MG/ML
5 INJECTION, SOLUTION INTRAVENOUS EVERY 5 MIN PRN
Status: DISCONTINUED | OUTPATIENT
Start: 2017-07-28 | End: 2017-07-28 | Stop reason: HOSPADM

## 2017-07-28 RX ORDER — VERAPAMIL HYDROCHLORIDE 2.5 MG/ML
1-2.5 INJECTION, SOLUTION INTRAVENOUS
Status: DISCONTINUED | OUTPATIENT
Start: 2017-07-28 | End: 2017-07-28 | Stop reason: HOSPADM

## 2017-07-28 RX ORDER — PROTAMINE SULFATE 10 MG/ML
25-100 INJECTION, SOLUTION INTRAVENOUS EVERY 5 MIN PRN
Status: DISCONTINUED | OUTPATIENT
Start: 2017-07-28 | End: 2017-07-28 | Stop reason: HOSPADM

## 2017-07-28 RX ORDER — DOBUTAMINE HYDROCHLORIDE 200 MG/100ML
2-20 INJECTION INTRAVENOUS CONTINUOUS PRN
Status: DISCONTINUED | OUTPATIENT
Start: 2017-07-28 | End: 2017-07-28 | Stop reason: HOSPADM

## 2017-07-28 RX ORDER — NITROGLYCERIN 5 MG/ML
100-500 VIAL (ML) INTRAVENOUS
Status: DISCONTINUED | OUTPATIENT
Start: 2017-07-28 | End: 2017-07-28 | Stop reason: HOSPADM

## 2017-07-28 RX ADMIN — NITROGLYCERIN 200 MCG: 5 INJECTION, SOLUTION INTRAVENOUS at 13:22

## 2017-07-28 RX ADMIN — ACETAMINOPHEN 650 MG: 325 TABLET, FILM COATED ORAL at 17:18

## 2017-07-28 RX ADMIN — FENTANYL CITRATE 50 MCG: 50 INJECTION, SOLUTION INTRAMUSCULAR; INTRAVENOUS at 13:21

## 2017-07-28 RX ADMIN — SODIUM CHLORIDE: 9 INJECTION, SOLUTION INTRAVENOUS at 12:01

## 2017-07-28 RX ADMIN — LIDOCAINE HYDROCHLORIDE 100 MG: 10 INJECTION, SOLUTION EPIDURAL; INFILTRATION; INTRACAUDAL; PERINEURAL at 13:09

## 2017-07-28 RX ADMIN — MIDAZOLAM HYDROCHLORIDE 1 MG: 1 INJECTION, SOLUTION INTRAMUSCULAR; INTRAVENOUS at 13:21

## 2017-07-28 RX ADMIN — IOPAMIDOL 62 ML: 755 INJECTION, SOLUTION INTRAVASCULAR at 13:30

## 2017-07-28 NOTE — DISCHARGE INSTRUCTIONS
Cardiac Angiogram Discharge Instructions - Femoral    After you go home:      Have an adult stay with you until tomorrow.    Drink extra fluids for 2 days.    You may resume your normal diet.    No smoking       For 24 hours - due to the sedation you received:    Relax and take it easy.    Do NOT make any important or legal decisions.    Do NOT drive or operate machines at home or at work.    Do NOT drink alcohol.    Care of Groin Puncture Site:      For the first 24 hrs - check the puncture site every 1-2 hours while awake.    For 2 days, when you cough, sneeze, laugh or move your bowels, hold your hand over the puncture site and press firmly.    Remove the bandaid after 24 hours. If there is minor oozing, apply another bandaid and remove it after 12 hours.    It is normal to have a small bruise or pea size lump at the site.    You may shower tomorrow. Do NOT take a bath, or use a hot tub or pool for at least 3 days. Do NOT scrub the site. Do not use lotion or powder near the puncture site.    Activity:            For 2 days:    No stooping or squatting    Do NOT do any heavy activity such as exercise, lifting, or straining.     No housework, yard work or any activity that make you sweat    Do NOT lift more than 10 pounds    Bleeding:      If you start bleeding from the site in your groin, lie down flat and press firmly on/above the site for 10 minutes.     Once bleeding stops, lay flat for 2 hours.     Call [facility] as soon as you can.       Call 911 right away if you have heavy bleeding or bleeding that does not stop.      Medicines:      If you are taking antiplatelet medications such as Plavix, Brilinta or Effient, do not stop taking it until you talk to your cardiologist.      If you are on Metformin (Glucophage) and your GFR (kidney function level) is >30, you may continue taking your Metformin.    If you are on Metformin (Glucophage) and your GFR (kidney function level) is <30, do not restart the Metformin  for 48 hours after your procedure. Check with your primary care giver before restarting the Metformin to see if you need to have blood drawn to recheck your kidney function (GFR).    Take your medications, including blood thinners, unless your provider tells you not to.  If you take Coumadin (Warfarin), have your INR checked by your provider in  3-5 days. Call your clinic to schedule this.    If you have stopped any medicines, check with your provider about when to restart them.    Follow Up Appointments:      Follow up with [facility] Nurse Practitioner at [facility] of patient preference in 7-10 days.    Call the clinic if:      You have increased pain or a large or growing hard lump around the site.    The site is red, swollen, hot or tender.    Blood or fluid is draining from the site.    You have chills or a fever greater than 101 F (38 C).    Your leg turns feels numb, cool or changes color.    You have hives, a rash or unusual itching.    New pain in the back or belly that you cannot control with Tylenol.    Any questions or concerns.          [facility] [place]:    [phone] [facility] (7 days a week)

## 2017-07-28 NOTE — PROGRESS NOTES
Here with SO for heart cath. NPO, Allergies noted, Not a fall risk. Pt took am meds as he arrived as he was unsure if he should hold them or not. Reviewed contrast information with pt, states understanding. Lungs CTA, diminished. S1S2. 1+ intermittent pedal pulses.

## 2017-07-28 NOTE — IP AVS SNAPSHOT
[place] [address]    [place] [address]    Phone:  [phone]                                       After Visit Summary   7/28/2017    Aries Hilario    MRN: [id number]           After Visit Summary Signature Page     I have received my discharge instructions, and my questions have been answered. I have discussed any challenges I see with this plan with the nurse or doctor.    ..........................................................................................................................................  Patient/Patient Representative Signature      ..........................................................................................................................................  Patient Representative Print Name and Relationship to Patient    ..................................................               ................................................  Date                                            Time    ..........................................................................................................................................  Reviewed by Signature/Title    ...................................................              ..............................................  Date                                                            Time

## 2017-07-28 NOTE — IP AVS SNAPSHOT
MRN:[id number]                      After Visit Summary   7/28/2017    Aries Hilario    MRN: [id number]           Visit Information        Department      7/28/2017 11:03 AM [facility]          Review of your medicines      UNREVIEWED medicines. Ask your doctor about these medicines        Dose / Directions    aspirin 81 MG tablet   Used for:  Coronary artery disease involving native coronary artery of native heart without angina pectoris        Dose:  81 mg   Take 1 tablet (81 mg) by mouth daily   Quantity:  30 tablet   Refills:  11       atorvastatin 40 MG tablet   Commonly known as:  LIPITOR   Used for:  Hyperlipidemia LDL goal <70        Dose:  40 mg   Take 1 tablet (40 mg) by mouth daily   Quantity:  90 tablet   Refills:  3       clopidogrel 75 MG tablet   Commonly known as:  PLAVIX   Used for:  NSTEMI (non-ST elevated myocardial infarction) (H)        Dose:  75 mg   Take 1 tablet (75 mg) by mouth daily   Quantity:  90 tablet   Refills:  3       isosorbide mononitrate 30 MG 24 hr tablet   Commonly known as:  IMDUR   Used for:  Unstable angina pectoris (H)        Dose:  30 mg   Take 1 tablet (30 mg) by mouth daily   Quantity:  30 tablet   Refills:  3       metoprolol 25 MG 24 hr tablet   Commonly known as:  TOPROL-XL   Used for:  NSTEMI (non-ST elevated myocardial infarction) (H)        Take 1/2 tablet by mouth daily.   Quantity:  45 tablet   Refills:  3       nitroGLYcerin 0.4 MG sublingual tablet   Commonly known as:  NITROSTAT   Used for:  Unstable angina pectoris (H)        For chest pain place 1 tablet under the tongue every 5 minutes for 3 doses. If symptoms persist 5 minutes after 1st dose call 911.   Quantity:  25 tablet   Refills:  3       oxybutynin 5 MG tablet   Commonly known as:  DITROPAN   Used for:  Erectile dysfunction due to arterial insufficiency        Dose:  5-10 mg   Take 1-2 tablets (5-10 mg) by mouth At Bedtime   Quantity:  60 tablet   Refills:  3        PROBIOTIC ADVANCED PO        Refills:  0                Protect others around you: Learn how to safely use, store and throw away your medicines at www.disposemymeds.org.         Follow-ups after your visit        Your next 10 appointments already scheduled     Aug 03, 2017 10:20 AM CDT   Return Visit with MERCY Ford CNP   [facility] AT [facility] ([facility])    [address] [place] [address] [phone]               Care Instructions        After Care Instructions     Discharge Instructions - Follow up with [facility] Nurse Practitioner        Follow up with [facility] Nurse Practitioner at [facility] of patient preference in 7-10 days.            Discharge Instructions - IF on Metformin (Glucophage or Glucovance) or Metformin containing medications       IF on Metformin (Glucophage or Glucovance) or Metformin containing medications , schedule a Basic Metabolic Panel at [facility] or Primary Clinic in 48 - 72 hours post procedure and PRIOR TO resuming the Metformin or Metformin containing medications.  Hold Metformin (Glucophage or Glucovance) or Metformin containing medications until after the Basic Metabolic Panel on the 2nd or 3rd day following the procedure.  May resume after blood draw is complete.                  Further instructions from your care team       Cardiac Angiogram Discharge Instructions - Femoral    After you go home:      Have an adult stay with you until tomorrow.    Drink extra fluids for 2 days.    You may resume your normal diet.    No smoking       For 24 hours - due to the sedation you received:    Relax and take it easy.    Do NOT make any important or legal decisions.    Do NOT drive or operate machines at home or at work.    Do NOT drink alcohol.    Care of Groin Puncture Site:      For the first 24 hrs - check the puncture site every 1-2 hours while awake.    For 2 days, when you cough, sneeze, laugh or  move your bowels, hold your hand over the puncture site and press firmly.    Remove the bandaid after 24 hours. If there is minor oozing, apply another bandaid and remove it after 12 hours.    It is normal to have a small bruise or pea size lump at the site.    You may shower tomorrow. Do NOT take a bath, or use a hot tub or pool for at least 3 days. Do NOT scrub the site. Do not use lotion or powder near the puncture site.    Activity:            For 2 days:    No stooping or squatting    Do NOT do any heavy activity such as exercise, lifting, or straining.     No housework, yard work or any activity that make you sweat    Do NOT lift more than 10 pounds    Bleeding:      If you start bleeding from the site in your groin, lie down flat and press firmly on/above the site for 10 minutes.     Once bleeding stops, lay flat for 2 hours.     Call [facility] as soon as you can.       Call 911 right away if you have heavy bleeding or bleeding that does not stop.      Medicines:      If you are taking antiplatelet medications such as Plavix, Brilinta or Effient, do not stop taking it until you talk to your cardiologist.      If you are on Metformin (Glucophage) and your GFR (kidney function level) is >30, you may continue taking your Metformin.    If you are on Metformin (Glucophage) and your GFR (kidney function level) is <30, do not restart the Metformin for 48 hours after your procedure. Check with your primary care giver before restarting the Metformin to see if you need to have blood drawn to recheck your kidney function (GFR).    Take your medications, including blood thinners, unless your provider tells you not to.  If you take Coumadin (Warfarin), have your INR checked by your provider in  3-5 days. Call your clinic to schedule this.    If you have stopped any medicines, check with your provider about when to restart them.    Follow Up Appointments:      Follow up with [facility] Nurse Practitioner at [facility]  Clinic of patient preference in 7-10 days.    Call the clinic if:      You have increased pain or a large or growing hard lump around the site.    The site is red, swollen, hot or tender.    Blood or fluid is draining from the site.    You have chills or a fever greater than 101 F (38 C).    Your leg turns feels numb, cool or changes color.    You have hives, a rash or unusual itching.    New pain in the back or belly that you cannot control with Tylenol.    Any questions or concerns.          [facility] at [place]:    [phone] [facility] (7 days a week)           Additional Information About Your Visit        MyChart Information     [employer] gives you secure access to your electronic health record. If you see a primary care provider, you can also send messages to your care team and make appointments. If you have questions, please call your primary care clinic.  If you do not have a primary care provider, please call [phone] and they will assist you.        Care EveryWhere ID     This is your Care EveryWhere ID. This could be used by other organizations to access your [place] medical records  [id number]        Your Vitals Were     Blood Pressure Pulse Temperature Respirations Weight BMI (Body Mass Index)    116/67 57 97.6  F (36.4  C) (Oral) 16 117.5 kg (259 lb) 35.13 kg/m2       Primary Care Provider Office Phone # Fax #    Elsa Hutchinson [phone]      Equal Access to Services     [facility] : Hadii aad ku hadasho Soomaali, waaxda luqadaha, qaybta kaalmada adeegyada, alexandro spring . So [facility] [phone].    ATENCIÓN: Si habla español, tiene a bell disposición servicios gratuitos de asistencia lingüística. Llame al [phone].    We comply with applicable federal civil rights laws and [place] laws. We do not discriminate on the basis of race, color, national origin, age, disability sex, sexual orientation or gender  identity.            Thank you!     Thank you for choosing [facility] for your care. Our goal is always to provide you with excellent care. Hearing back from our patients is one way we can continue to improve our services. Please take a few minutes to complete the written survey that you may receive in the mail after you visit with us. Thank you!             Medication List: This is a list of all your medications and when to take them. Check marks below indicate your daily home schedule. Keep this list as a reference.      Medications           Morning Afternoon Evening Bedtime As Needed    aspirin 81 MG tablet   Take 1 tablet (81 mg) by mouth daily                                atorvastatin 40 MG tablet   Commonly known as:  LIPITOR   Take 1 tablet (40 mg) by mouth daily                                clopidogrel 75 MG tablet   Commonly known as:  PLAVIX   Take 1 tablet (75 mg) by mouth daily                                isosorbide mononitrate 30 MG 24 hr tablet   Commonly known as:  IMDUR   Take 1 tablet (30 mg) by mouth daily                                metoprolol 25 MG 24 hr tablet   Commonly known as:  TOPROL-XL   Take 1/2 tablet by mouth daily.                                nitroGLYcerin 0.4 MG sublingual tablet   Commonly known as:  NITROSTAT   For chest pain place 1 tablet under the tongue every 5 minutes for 3 doses. If symptoms persist 5 minutes after 1st dose call 911.                                oxybutynin 5 MG tablet   Commonly known as:  DITROPAN   Take 1-2 tablets (5-10 mg) by mouth At Bedtime                                PROBIOTIC ADVANCED PO

## 2017-07-29 NOTE — PROGRESS NOTES
Report from Gerald RN @ 1500. Pt has been sleepy but awakens to voice. Right groin flat soft with a shadow on distal end of drsg which was reported as being present prior. CMS good to LE's. Pt putting his head higher than 30' and not holding pressure to groin when coughing, and crossing his legs.  Pt admits to having a HA, relief from 650 mg po tylenol.   1927 Up walking in halls, right groin flat soft no bruising bleeding or hematoma. Tolerated activity well. Pt extremely anxious to leave. Has been asking since 1500. Tolerated PO solids and liquids. Has voided. Will d/c to home when pt is ready.

## 2017-08-01 LAB — INTERPRETATION ECG - MUSE: NORMAL

## 2017-08-04 ENCOUNTER — TELEPHONE (OUTPATIENT)
Dept: FAMILY MEDICINE | Facility: CLINIC | Age: 62
End: 2017-08-04

## 2017-08-04 NOTE — TELEPHONE ENCOUNTER
It looks liek this was only prescribed once a year ago by Ezra. If its a new prescription, he needs some follow-up. If its a refill from another provider, he needs to contact that provider.

## 2017-08-04 NOTE — TELEPHONE ENCOUNTER
Bupropion 100mg      Last Written Prescription Date:  10/17/17  Last Fill Quantity: 60,   # refills: 0  Last Office Visit with [facility], [facility] or [facility] prescribing provider: 07/26/17  Future Office visit:       Routing refill request to provider for review/approval because:  Drug not active on patient's medication list    Thank you,  Rosa Maria Brown CPhT  On behalf of [facility]

## 2017-08-07 NOTE — TELEPHONE ENCOUNTER
LM for patient to schedule an appt. To come in and be seen for a refill.    Katarina Anderson, [facility]

## 2017-10-09 DIAGNOSIS — I21.4 NSTEMI (NON-ST ELEVATED MYOCARDIAL INFARCTION) (H): ICD-10-CM

## 2017-10-09 NOTE — TELEPHONE ENCOUNTER
Clopidogrel  75mg           Last Written Prescription Date: 10/05/2016 #90 x 3  Last filled 0706/2017  Last Office Visit with [facility] or  Health prescribing provider:  10/17/2016 ABIGAIL Munguia   Future Office Visit:   none    Lab Results   Component Value Date    WBC 8.5 07/28/2017     Lab Results   Component Value Date    RBC 5.26 07/28/2017     Lab Results   Component Value Date    HGB 16.4 07/28/2017     Lab Results   Component Value Date    HCT 47.3 07/28/2017     No components found for: MCT  Lab Results   Component Value Date    MCV 90 07/28/2017     Lab Results   Component Value Date    MCH 31.2 07/28/2017     Lab Results   Component Value Date    MCHC 34.7 07/28/2017     Lab Results   Component Value Date    RDW 12.8 07/28/2017     Lab Results   Component Value Date     07/28/2017     Lab Results   Component Value Date    AST 19 10/17/2016     Lab Results   Component Value Date    ALT 27 10/17/2016     Creatinine   Date Value Ref Range Status   07/28/2017 0.96 0.66 - 1.25 mg/dL Final   ]    Metoprolol Succ ER  25mg      Last Written Prescription Date: 07/09/2016 #45 x 3  Last filled 07/06/2017  Last Office Visit with [facility] or [facility] prescribing provider:  10/17/2016 ABIGAIL Munguia   Future Office Visit:    none    BP Readings from Last 3 Encounters:   07/28/17 (!) 152/100   12/22/16 123/74   12/19/16 124/78

## 2017-10-11 RX ORDER — CLOPIDOGREL BISULFATE 75 MG/1
TABLET ORAL
Qty: 90 TABLET | Refills: 3 | Status: SHIPPED | OUTPATIENT
Start: 2017-10-11 | End: 2018-10-06

## 2017-10-11 RX ORDER — METOPROLOL SUCCINATE 25 MG/1
TABLET, EXTENDED RELEASE ORAL
Qty: 45 TABLET | Refills: 3 | Status: SHIPPED | OUTPATIENT
Start: 2017-10-11 | End: 2018-09-12

## 2017-10-11 NOTE — TELEPHONE ENCOUNTER
Prescription approved per [facility] Refill Protocol or patient Primary care provider (PCP)  ISRAEL Arenas RN/Gabriele Aquino

## 2018-04-03 ENCOUNTER — OFFICE VISIT (OUTPATIENT)
Dept: OTOLARYNGOLOGY | Facility: CLINIC | Age: 63
End: 2018-04-03
Payer: COMMERCIAL

## 2018-04-03 VITALS
BODY MASS INDEX: 33.05 KG/M2 | WEIGHT: 244 LBS | HEIGHT: 72 IN | TEMPERATURE: 98.2 F | HEART RATE: 81 BPM | DIASTOLIC BLOOD PRESSURE: 90 MMHG | SYSTOLIC BLOOD PRESSURE: 122 MMHG

## 2018-04-03 DIAGNOSIS — J32.0 CHRONIC MAXILLARY SINUSITIS: Primary | ICD-10-CM

## 2018-04-03 DIAGNOSIS — J34.2 DEVIATED NASAL SEPTUM: ICD-10-CM

## 2018-04-03 PROCEDURE — 99204 OFFICE O/P NEW MOD 45 MIN: CPT | Performed by: OTOLARYNGOLOGY

## 2018-04-03 RX ORDER — FLUTICASONE PROPIONATE 50 MCG
1-2 SPRAY, SUSPENSION (ML) NASAL DAILY
Qty: 16 G | Refills: 1 | Status: SHIPPED | OUTPATIENT
Start: 2018-04-03 | End: 2018-09-12

## 2018-04-03 ASSESSMENT — PAIN SCALES - GENERAL: PAINLEVEL: NO PAIN (0)

## 2018-04-03 NOTE — LETTER
4/3/2018         RE: Aries Hilario  [id number] [address] [address]        Dear Colleague,    Thank you for referring your patient, Aries Hilario, to the [facility]. Please see a copy of my visit note below.        History of Present Illness - Aries Hilario is a 62 year old male who presents with concerns that he cannot breathe well through his nose in Winter. He feels he has excessive nasal mucus. It gets better in the Spring and Summer. He has not discussed this with his PCP or tried any medical therapy prior to arranging this surgical consultation. He is currently on Plavix due to history of cardiac stent.     Past Medical History -   Patient Active Problem List   Diagnosis     Hepatitis C, chronic, in remission     Cannabis abuse, continuous     Thrombocytopenia due to drugs     Drug-induced leukopenia (H)     Anemia     Anxiety     GERD (gastroesophageal reflux disease)     Low back pain     NSTEMI (non-ST elevated myocardial infarction) (H)     Advanced directives, counseling/discussion     Hyperlipidemia LDL goal <70     Foreign body aspiration     Gastrointestinal hemorrhage with melena     Mild TBI (traumatic brain injury), with loss of consciousness of 30 minutes or less, subsequent encounter     Dizziness     Peripheral polyneuropathy     Abnormal weight gain     Urinary retention     Duodenal ulcer     H. pylori infection     Duodenal ulcer due to Helicobacter pylori       Current Medications -   Current Outpatient Prescriptions:      fluticasone (FLONASE) 50 MCG/ACT spray, Spray 1-2 sprays into both nostrils daily, Disp: 16 g, Rfl: 1     clopidogrel (PLAVIX) 75 MG tablet, TAKE ONE TABLET BY MOUTH EVERY DAY, Disp: 90 tablet, Rfl: 3     metoprolol (TOPROL-XL) 25 MG 24 hr tablet, TAKE ONE-HALF TABLET BY MOUTH DAILY, Disp: 45 tablet, Rfl: 3     nitroGLYcerin (NITROSTAT) 0.4 MG sublingual tablet, For chest pain place 1 tablet under the tongue every 5 minutes for 3 doses. If  symptoms persist 5 minutes after 1st dose call 911., Disp: 25 tablet, Rfl: 3     atorvastatin (LIPITOR) 40 MG tablet, Take 1 tablet (40 mg) by mouth daily, Disp: 90 tablet, Rfl: 3     aspirin 81 MG tablet, Take 1 tablet (81 mg) by mouth daily, Disp: 30 tablet, Rfl: 11     Probiotic Product (PROBIOTIC ADVANCED PO), , Disp: , Rfl:   No current facility-administered medications for this visit.     Facility-Administered Medications Ordered in Other Visits:      ceFAZolin (ANCEF) 1 g vial to attach to IVPB, , , PRN, Vad, Michael Duane, APRN CRNA, 2 g at 12/01/14 0740    Allergies -   Allergies   Allergen Reactions     Penicillins Hives       Social History -   Social History     Social History     Marital status:      Spouse name: N/A     Number of children: N/A     Years of education: N/A     Social History Main Topics     Smoking status: Former Smoker     Quit date: 1/1/2005     Smokeless tobacco: Never Used     Alcohol use No     Drug use: No     Sexual activity: Yes     Partners: Female     Other Topics Concern     Parent/Sibling W/ Cabg, Mi Or Angioplasty Before 65f 55m? No     Social History Narrative       Family History -   Family History   Problem Relation Age of Onset     Depression Father      suicide     Coronary Artery Disease Brother      MI in his 60s     Asthma No family hx of      DIABETES No family hx of      Hypertension No family hx of      CEREBROVASCULAR DISEASE No family hx of      Cancer - colorectal No family hx of      Prostate Cancer No family hx of        Review of Systems - As per HPI and PMHx, otherwise 7 system review of the head and neck negative. 10+ system review negative.    Physical Exam  /90 (BP Location: Right arm, Patient Position: Sitting, Cuff Size: Adult Regular)  Pulse 81  Temp 98.2  F (36.8  C) (Oral)  Ht 1.829 m (6')  Wt 110.7 kg (244 lb)  BMI 33.09 kg/m2  General - The patient is well nourished and well developed, and appears to have good nutritional status.   Alert and oriented to person and place, answers questions and cooperates with examination appropriately.   Head and Face - Normocephalic and atraumatic, with no gross asymmetry noted of the contour of the facial features.  The facial nerve is intact, with strong symmetric movements.  Voice and Breathing - The patient was breathing comfortably without the use of accessory muscles. There was no wheezing, stridor, or stertor.  The patients voice was clear and strong, and had appropriate pitch and quality.  Ears - Bilateral pinna and EACs with normal appearing overlying skin. Tympanic membrane intact with good mobility on pneumatic otoscopy bilaterally. Bony landmarks of the ossicular chain are normal. The tympanic membranes are normal in appearance. No retraction, perforation, or masses.  No fluid or purulence was seen in the external canal or the middle ear.   Eyes - Extraocular movements intact.  Sclera were not icteric or injected, conjunctiva were pink and moist.  Mouth - Examination of the oral cavity showed pink, healthy oral mucosa. No lesions or ulcerations noted.  The tongue was mobile and midline, and the dentition were in good condition.    Throat - The walls of the oropharynx were smooth, pink, moist, symmetric, and had no lesions or ulcerations.  The tonsillar pillars and soft palate were symmetric.  The uvula was midline on elevation.  Neck - Normal midline excursion of the laryngotracheal complex during swallowing.  Full range of motion on passive movement.  Palpation of the occipital, submental, submandibular, internal jugular chain, and supraclavicular nodes did not demonstrate any abnormal lymph nodes or masses.  The carotid pulse was palpable bilaterally.  Palpation of the thyroid was soft and smooth, with no nodules or goiter appreciated.  The trachea was mobile and midline.  Nose - External contour is symmetric, no gross deflection or scars.  Nasal mucosa is pink and moist with no abnormal mucus.   The septum was deviated to the left. Left nasal airway about 50% patency of the right. No evidence of nasal polyps.        Assessment - Aries Hilario is a 62 year old male with left deviated septum. He may also have chronic sinusitis, but he will need to try at least 3 weeks of nasal steroids first before undergoing an evaluation with CT Sinus. If no chronic sinus disease, he could consider septoplasty and bilateral inferior turbinate reduction to improve nasal breathing. He will need to be off of Plavix prior to this elective procedure, and so was advised to discuss this with his cardiologist.    I also discussed that he might actually have reflux causing his gagging and sense of phlegm. Recommend trial of OTC PPI, and discuss with PCP. If not better, may need GI referral.    Patient to follow up with Primary Care provider regarding elevated blood pressure.         Dr. Wesly Tesfaye MD  Otolaryngology  [facility]        Again, thank you for allowing me to participate in the care of your patient.        Sincerely,        Wesly Tesfaye MD

## 2018-04-03 NOTE — PROGRESS NOTES
History of Present Illness - Aries Hilario is a 62 year old male who presents with concerns that he cannot breathe well through his nose in Winter. He feels he has excessive nasal mucus. It gets better in the Spring and Summer. He has not discussed this with his PCP or tried any medical therapy prior to arranging this surgical consultation. He is currently on Plavix due to history of cardiac stent.     Past Medical History -   Patient Active Problem List   Diagnosis     Hepatitis C, chronic, in remission     Cannabis abuse, continuous     Thrombocytopenia due to drugs     Drug-induced leukopenia (H)     Anemia     Anxiety     GERD (gastroesophageal reflux disease)     Low back pain     NSTEMI (non-ST elevated myocardial infarction) (H)     Advanced directives, counseling/discussion     Hyperlipidemia LDL goal <70     Foreign body aspiration     Gastrointestinal hemorrhage with melena     Mild TBI (traumatic brain injury), with loss of consciousness of 30 minutes or less, subsequent encounter     Dizziness     Peripheral polyneuropathy     Abnormal weight gain     Urinary retention     Duodenal ulcer     H. pylori infection     Duodenal ulcer due to Helicobacter pylori       Current Medications -   Current Outpatient Prescriptions:      fluticasone (FLONASE) 50 MCG/ACT spray, Spray 1-2 sprays into both nostrils daily, Disp: 16 g, Rfl: 1     clopidogrel (PLAVIX) 75 MG tablet, TAKE ONE TABLET BY MOUTH EVERY DAY, Disp: 90 tablet, Rfl: 3     metoprolol (TOPROL-XL) 25 MG 24 hr tablet, TAKE ONE-HALF TABLET BY MOUTH DAILY, Disp: 45 tablet, Rfl: 3     nitroGLYcerin (NITROSTAT) 0.4 MG sublingual tablet, For chest pain place 1 tablet under the tongue every 5 minutes for 3 doses. If symptoms persist 5 minutes after 1st dose call 911., Disp: 25 tablet, Rfl: 3     atorvastatin (LIPITOR) 40 MG tablet, Take 1 tablet (40 mg) by mouth daily, Disp: 90 tablet, Rfl: 3     aspirin 81 MG tablet, Take 1 tablet (81 mg) by mouth daily,  Disp: 30 tablet, Rfl: 11     Probiotic Product (PROBIOTIC ADVANCED PO), , Disp: , Rfl:   No current facility-administered medications for this visit.     Facility-Administered Medications Ordered in Other Visits:      ceFAZolin (ANCEF) 1 g vial to attach to IVPB, , , PRN, Vad, Michael Duane, APRN CRNA, 2 g at 12/01/14 0740    Allergies -   Allergies   Allergen Reactions     Penicillins Hives       Social History -   Social History     Social History     Marital status:      Spouse name: N/A     Number of children: N/A     Years of education: N/A     Social History Main Topics     Smoking status: Former Smoker     Quit date: 1/1/2005     Smokeless tobacco: Never Used     Alcohol use No     Drug use: No     Sexual activity: Yes     Partners: Female     Other Topics Concern     Parent/Sibling W/ Cabg, Mi Or Angioplasty Before 65f 55m? No     Social History Narrative       Family History -   Family History   Problem Relation Age of Onset     Depression Father      suicide     Coronary Artery Disease Brother      MI in his 60s     Asthma No family hx of      DIABETES No family hx of      Hypertension No family hx of      CEREBROVASCULAR DISEASE No family hx of      Cancer - colorectal No family hx of      Prostate Cancer No family hx of        Review of Systems - As per HPI and PMHx, otherwise 7 system review of the head and neck negative. 10+ system review negative.    Physical Exam  /90 (BP Location: Right arm, Patient Position: Sitting, Cuff Size: Adult Regular)  Pulse 81  Temp 98.2  F (36.8  C) (Oral)  Ht 1.829 m (6')  Wt 110.7 kg (244 lb)  BMI 33.09 kg/m2  General - The patient is well nourished and well developed, and appears to have good nutritional status.  Alert and oriented to person and place, answers questions and cooperates with examination appropriately.   Head and Face - Normocephalic and atraumatic, with no gross asymmetry noted of the contour of the facial features.  The facial nerve is  intact, with strong symmetric movements.  Voice and Breathing - The patient was breathing comfortably without the use of accessory muscles. There was no wheezing, stridor, or stertor.  The patients voice was clear and strong, and had appropriate pitch and quality.  Ears - Bilateral pinna and EACs with normal appearing overlying skin. Tympanic membrane intact with good mobility on pneumatic otoscopy bilaterally. Bony landmarks of the ossicular chain are normal. The tympanic membranes are normal in appearance. No retraction, perforation, or masses.  No fluid or purulence was seen in the external canal or the middle ear.   Eyes - Extraocular movements intact.  Sclera were not icteric or injected, conjunctiva were pink and moist.  Mouth - Examination of the oral cavity showed pink, healthy oral mucosa. No lesions or ulcerations noted.  The tongue was mobile and midline, and the dentition were in good condition.    Throat - The walls of the oropharynx were smooth, pink, moist, symmetric, and had no lesions or ulcerations.  The tonsillar pillars and soft palate were symmetric.  The uvula was midline on elevation.  Neck - Normal midline excursion of the laryngotracheal complex during swallowing.  Full range of motion on passive movement.  Palpation of the occipital, submental, submandibular, internal jugular chain, and supraclavicular nodes did not demonstrate any abnormal lymph nodes or masses.  The carotid pulse was palpable bilaterally.  Palpation of the thyroid was soft and smooth, with no nodules or goiter appreciated.  The trachea was mobile and midline.  Nose - External contour is symmetric, no gross deflection or scars.  Nasal mucosa is pink and moist with no abnormal mucus.  The septum was deviated to the left. Left nasal airway about 50% patency of the right. No evidence of nasal polyps.        Assessment - Aries Hilario is a 62 year old male with left deviated septum. He may also have chronic sinusitis, but he  will need to try at least 3 weeks of nasal steroids first before undergoing an evaluation with CT Sinus. If no chronic sinus disease, he could consider septoplasty and bilateral inferior turbinate reduction to improve nasal breathing. He will need to be off of Plavix prior to this elective procedure, and so was advised to discuss this with his cardiologist.    I also discussed that he might actually have reflux causing his gagging and sense of phlegm. Recommend trial of OTC PPI, and discuss with PCP. If not better, may need GI referral.    Patient to follow up with Primary Care provider regarding elevated blood pressure.         Dr. Wesly Tesfaye MD  Otolaryngology  [facility]

## 2018-04-03 NOTE — MR AVS SNAPSHOT
After Visit Summary   4/3/2018    Aries Hilario    MRN: [id number]           Patient Information     Date Of Birth          1955        Visit Information        Provider Department      4/3/2018 11:15 AM Wesly Tesfaye MD [facility]        Today's Diagnoses     Chronic maxillary sinusitis    -  1    Deviated nasal septum          Care Instructions    Per Physician's instructions            Follow-ups after your visit        Future tests that were ordered for you today     Open Future Orders        Priority Expected Expires Ordered    CT Maxillofacial w/o Contrast Routine  4/3/2019 4/3/2018            Who to contact     If you have questions or need follow up information about today's clinic visit or your schedule please contact [facility] directly at [phone].  Normal or non-critical lab and imaging results will be communicated to you by MyChart, letter or phone within 4 business days after the clinic has received the results. If you do not hear from us within 7 days, please contact the clinic through [employer] or phone. If you have a critical or abnormal lab result, we will notify you by phone as soon as possible.  Submit refill requests through [employer] or call your pharmacy and they will forward the refill request to us. Please allow 3 business days for your refill to be completed.          Additional Information About Your Visit        MyChart Information     [employer] gives you secure access to your electronic health record. If you see a primary care provider, you can also send messages to your care team and make appointments. If you have questions, please call your primary care clinic.  If you do not have a primary care provider, please call [phone] and they will assist you.        Care EveryWhere ID     This is your Care EveryWhere ID. This could be used by other organizations to access your [facility] medical records  [id number]        Your Vitals Were      Pulse Temperature Height BMI (Body Mass Index)          81 98.2  F (36.8  C) (Oral) 1.829 m (6') 33.09 kg/m2         Blood Pressure from Last 3 Encounters:   04/03/18 122/90   07/28/17 (!) 152/100   12/22/16 123/74    Weight from Last 3 Encounters:   04/03/18 110.7 kg (244 lb)   07/28/17 117.5 kg (259 lb)   12/22/16 107 kg (236 lb)                 Today's Medication Changes          These changes are accurate as of 4/3/18 11:51 AM.  If you have any questions, ask your nurse or doctor.               Start taking these medicines.        Dose/Directions    fluticasone 50 MCG/ACT spray   Commonly known as:  FLONASE   Used for:  Chronic maxillary sinusitis   Started by:  Wesly Tesfaye MD        Dose:  1-2 spray   Spray 1-2 sprays into both nostrils daily   Quantity:  16 g   Refills:  1         Stop taking these medicines if you haven't already. Please contact your care team if you have questions.     isosorbide mononitrate 30 MG 24 hr tablet   Commonly known as:  IMDUR   Stopped by:  Wesly Tesfaye MD           oxybutynin 5 MG tablet   Commonly known as:  DITROPAN   Stopped by:  Wesly Tesfaye MD                Where to get your medicines      These medications [facility] [address] [facility]  [address] [facility] [address]     Phone:  [phone]     fluticasone 50 MCG/ACT spray                Primary Care Provider Office Phone # Fax #    Elsa Hutchinson [phone] [address] [place] [address]        Equal Access to Services     [facility]: Sheron Rosales, tres amaya, tristin kaalmada baronda, alexandro medley. So [facility] [phone].    ATENCIÓN: Si habla español, tiene a bell disposición servicios gratuitos de asistencia lingüística. Llame al [phone].    We comply with applicable federal civil rights laws and [place] laws. We do not discriminate on the  basis of race, color, national origin, age, disability, sex, sexual orientation, or gender identity.            Thank you!     Thank you for choosing [facility]  for your care. Our goal is always to provide you with excellent care. Hearing back from our patients is one way we can continue to improve our services. Please take a few minutes to complete the written survey that you may receive in the mail after your visit with us. Thank you!             Your Updated Medication List - Protect others around you: Learn how to safely use, store and throw away your medicines at www.disposemymeds.org.          This list is accurate as of 4/3/18 11:51 AM.  Always use your most recent med list.                   Brand Name Dispense Instructions for use Diagnosis    aspirin 81 MG tablet     30 tablet    Take 1 tablet (81 mg) by mouth daily    Coronary artery disease involving native coronary artery of native heart without angina pectoris       atorvastatin 40 MG tablet    LIPITOR    90 tablet    Take 1 tablet (40 mg) by mouth daily    Hyperlipidemia LDL goal <70       clopidogrel 75 MG tablet    PLAVIX    90 tablet    TAKE ONE TABLET BY MOUTH EVERY DAY    NSTEMI (non-ST elevated myocardial infarction) (H)       fluticasone 50 MCG/ACT spray    FLONASE    16 g    Spray 1-2 sprays into both nostrils daily    Chronic maxillary sinusitis       metoprolol succinate 25 MG 24 hr tablet    TOPROL-XL    45 tablet    TAKE ONE-HALF TABLET BY MOUTH DAILY    NSTEMI (non-ST elevated myocardial infarction) (H)       nitroGLYcerin 0.4 MG sublingual tablet    NITROSTAT    25 tablet    For chest pain place 1 tablet under the tongue every 5 minutes for 3 doses. If symptoms persist 5 minutes after 1st dose call 911.    Unstable angina pectoris (H)       PROBIOTIC ADVANCED PO

## 2018-06-29 ENCOUNTER — OFFICE VISIT (OUTPATIENT)
Dept: INTERNAL MEDICINE | Facility: CLINIC | Age: 63
End: 2018-06-29
Payer: COMMERCIAL

## 2018-06-29 ENCOUNTER — TELEPHONE (OUTPATIENT)
Dept: FAMILY MEDICINE | Facility: CLINIC | Age: 63
End: 2018-06-29

## 2018-06-29 ENCOUNTER — HOSPITAL ENCOUNTER (OUTPATIENT)
Dept: CT IMAGING | Facility: CLINIC | Age: 63
Discharge: HOME | End: 2018-06-29
Attending: INTERNAL MEDICINE | Admitting: INTERNAL MEDICINE
Payer: COMMERCIAL

## 2018-06-29 VITALS
SYSTOLIC BLOOD PRESSURE: 149 MMHG | HEART RATE: 74 BPM | BODY MASS INDEX: 36.48 KG/M2 | RESPIRATION RATE: 16 BRPM | TEMPERATURE: 97.8 F | WEIGHT: 269 LBS | DIASTOLIC BLOOD PRESSURE: 79 MMHG

## 2018-06-29 DIAGNOSIS — R10.32 ABDOMINAL PAIN, LEFT LOWER QUADRANT: Primary | ICD-10-CM

## 2018-06-29 DIAGNOSIS — R10.32 ABDOMINAL PAIN, LEFT LOWER QUADRANT: ICD-10-CM

## 2018-06-29 DIAGNOSIS — F41.9 ANXIETY: ICD-10-CM

## 2018-06-29 PROCEDURE — 74177 CT ABD & PELVIS W/CONTRAST: CPT

## 2018-06-29 PROCEDURE — 25000128 H RX IP 250 OP 636: Performed by: RADIOLOGY

## 2018-06-29 PROCEDURE — 25000125 ZZHC RX 250: Performed by: RADIOLOGY

## 2018-06-29 PROCEDURE — 99214 OFFICE O/P EST MOD 30 MIN: CPT | Performed by: INTERNAL MEDICINE

## 2018-06-29 RX ORDER — CIPROFLOXACIN 500 MG/1
500 TABLET, FILM COATED ORAL 2 TIMES DAILY
Qty: 14 TABLET | Refills: 0 | Status: SHIPPED | OUTPATIENT
Start: 2018-06-29 | End: 2018-09-12

## 2018-06-29 RX ORDER — METRONIDAZOLE 250 MG/1
500 TABLET ORAL 2 TIMES DAILY
Qty: 28 TABLET | Refills: 0 | Status: SHIPPED | OUTPATIENT
Start: 2018-06-29 | End: 2018-09-12

## 2018-06-29 RX ORDER — IOPAMIDOL 755 MG/ML
100 INJECTION, SOLUTION INTRAVASCULAR ONCE
Status: COMPLETED | OUTPATIENT
Start: 2018-06-29 | End: 2018-06-29

## 2018-06-29 RX ADMIN — SODIUM CHLORIDE 73 ML: 9 INJECTION, SOLUTION INTRAVENOUS at 17:21

## 2018-06-29 RX ADMIN — IOPAMIDOL 100 ML: 755 INJECTION, SOLUTION INTRAVENOUS at 17:21

## 2018-06-29 ASSESSMENT — ANXIETY QUESTIONNAIRES
3. WORRYING TOO MUCH ABOUT DIFFERENT THINGS: SEVERAL DAYS
1. FEELING NERVOUS, ANXIOUS, OR ON EDGE: NOT AT ALL
6. BECOMING EASILY ANNOYED OR IRRITABLE: SEVERAL DAYS
7. FEELING AFRAID AS IF SOMETHING AWFUL MIGHT HAPPEN: SEVERAL DAYS
5. BEING SO RESTLESS THAT IT IS HARD TO SIT STILL: NOT AT ALL
IF YOU CHECKED OFF ANY PROBLEMS ON THIS QUESTIONNAIRE, HOW DIFFICULT HAVE THESE PROBLEMS MADE IT FOR YOU TO DO YOUR WORK, TAKE CARE OF THINGS AT HOME, OR GET ALONG WITH OTHER PEOPLE: NOT DIFFICULT AT ALL
2. NOT BEING ABLE TO STOP OR CONTROL WORRYING: SEVERAL DAYS
GAD7 TOTAL SCORE: 4

## 2018-06-29 ASSESSMENT — PATIENT HEALTH QUESTIONNAIRE - PHQ9: 5. POOR APPETITE OR OVEREATING: NOT AT ALL

## 2018-06-29 NOTE — TELEPHONE ENCOUNTER
On call note. Ct + mild diverticulitis. No abscess. Called patient's cell and left message to take antibiotics given and liquid diet with ADAT. To ER if a lot worse. Follow-up per pmd. Edis Hamilton MD

## 2018-06-29 NOTE — PROGRESS NOTES
"  SUBJECTIVE:   Aries Hilario is a 63 year old male who presents to clinic today for the following health issues:      Abdominal Pain      Duration: 1 day    Description (location/character/radiation): after having lunch yesterday he started having left lower abdominal pain       Associated flank pain: None    Intensity:  mild    Accompanying signs and symptoms:        Fever/Chills: no        Gas/Bloating: no        Nausea/vomitting: no        Diarrhea: no        Dysuria or Hematuria: no     History (previous similar pain/trauma/previous testing): uncertain    Precipitating or alleviating factors:       Pain worse with eating/BM/urination: no       Pain relieved by BM: YES    Therapies tried and outcome: None    LMP:  not applicable      Left upper extremity and lower extremity neuropathy / paresthesia - constant and worsening.    Denies fever, chills, or night sweats.    Upset bouncing lawn tractor moving pasture yesterday.  Also sanding deck.  Accompanied by wife today who supplements and augments (disagrees with) patient reported history.  He's put on 30 lbs over the past 1-6 months.  Very obese son 450 lbs undergoing self startvation for rapid weight loss.    Reports recently \"discovering\" Tabasco sauce and eating large meals recently.    PMH: Updated and/or reviewed in chart.    PSH: Updated and/or reviewed in chart.    ROS:  Constitutional, neuro, EMT, endocrine, pulmonary, cardiac, gastrointestinal, genitourinary, musculoskeletal, integument and psychiatric systems are otherwise negative.    OBJECTIVE:                                                    /79  Pulse 74  Temp 97.8  F (36.6  C) (Tympanic)  Resp 16  Wt 269 lb (122 kg)  BMI 36.48 kg/m2    GEN: No acute distress  EYES: No conjunctival injection or icterus, EOMI grossly intact  RESP: Unlabored, regular, clear to auscultation bilaterally  COR: regular rate and rhythm no murmurs, rubs, or gallops appreciated  ABDO: rotund, distended, but " overall soft, tender to palpation over LLQ, otherwise non-tender, non-distended, no hepatosplenomegaly or masses appreciated; slight hyperpigmentation of bilateral upper groin/flanks (not loins) that appears chronic   BACK: no deformity or CVA tenderness  NEURO: Normal gait, MAEx4, light touch sensation grossly intact  PSYCH: Normal mood and affect       ASSESSMENT/PLAN:                                                    1. Abdominal pain, left lower quadrant  DDx includes diverticulitis vs abdominal wall herniation -- no clear masses appreciated but significant tenderness to palpation.  Doubt intraabdominal or retroperitoneal hemorrhage. Patient requested minimal evaluation for financial considerations.  We elected empiric antibiotic treatment for diverticulitis and agreed to CT scan without other laboratory evaluation today.  If worsening, directed to return to clinic or emergency department for work-up.  May have colorectal ulceration/inflammation secondary to recent capsaicin intake.    - CT Abdomen Pelvis w/o Contrast; Future    Antwon Scott

## 2018-06-29 NOTE — TELEPHONE ENCOUNTER
Reason for Call:  LLQ abdominal pain    Detailed comments: patients girlfriend is calling for this patient who states he has LLQ abdominal pain started today and it is a pain that is all time. He has been busy working on a deck, so he has been kind of physical. He is a Dr. Hutchinson patient, and she told me that he does not like going to him, so they are calling us at [facility]. Wondering if he should be seen? I told her we would most likely need to speak to the patient as we will have questions for him.    Phone Number Patient can be reached at:HIs cell is [phone], her number is [phone].    Best Time: any    Can we leave a detailed message on this number? YES   Katarina Howard  Clinic Station  [facility]      Call taken on 6/29/2018 at 11:00 AM by Katarina Howard

## 2018-06-29 NOTE — TELEPHONE ENCOUNTER
"    S-(situation): Abdominal Pain    B-(background): Patient started having pain yesterday in the LLQ of abdomen    A-(assessment): Patient states he is having 4/10 pain in the left lower quadrant just below his belly button and in line with his waist level. He states it is worse today than yesterday. He states \"good thing I have a pair of loose pants\". States the pain gets worse when he pushes on the area but the pain is constant. He denies any injury, temperatures, feeling faint, or having n/v. Patient reports having 2 bm's today that were normal with no blood.     R-(recommendations): Go to the ED. Patient is reluctant to go there. Advised patient again, multiple times to go in and he refused. Pt made aware of signs and symptoms to further watch for.  Malka VASQUEZ RN      "

## 2018-06-29 NOTE — MR AVS SNAPSHOT
After Visit Summary   6/29/2018    Aries Hilario    MRN: [id number]           Patient Information     Date Of Birth          1955        Visit Information        Provider Department      6/29/2018 2:30 PM Atnwon Scott MD [facility]        Today's Diagnoses     Abdominal pain, left lower quadrant    -  1       Follow-ups after your visit        Follow-up notes from your care team     Return if symptoms worsen or fail to improve.      Your next 10 appointments already scheduled     Jun 29, 2018  5:15 PM CDT   CT ABDOMEN PELVIS W/O CONTRAST with [id number]   [facility] CT ([facility])    [address] [address]   [phone]           Please bring any scans or X-rays taken at other hospitals, if similar tests were done. Also bring a list of your medicines, including vitamins, minerals and over-the-counter drugs. It is safest to leave personal items at home.  Be sure to tell your doctor:   If you have any allergies.   If there s any chance you are pregnant.   If you are breastfeeding.  How to prepare:   Do not eat or drink for 2 hours before your exam. If you need to take medicine, you may take it with small sips of water. (We may ask you to take liquid medicine as well.)   Please wear loose clothing, such as a sweat suit or jogging clothes. Avoid snaps, zippers and other metal. We may ask you to undress and put on a hospital gown.  Please arrive 30 minutes early for your CT. Once in the department you might be asked to drink water 15-20 minutes prior to your exam.  If indicated you may be asked to drink an oral contrast in advance of your CT.  If this is the case, the imaging team will let you know or be in contact with you prior to your appointment  Patients over 70 or patients with diabetes or kidney problems:   If you haven t had a blood test (creatinine test) within the last 30 days, the Cardiologist/Radiologist may require you to get this test  prior to your exam.  If you have diabetes:   Continue to take your metformin medication on the day of your exam  If you have any questions, please call the Imaging Department where you will have your exam.              Future tests that were ordered for you today     Open Future Orders        Priority Expected Expires Ordered    CT Abdomen Pelvis w/o Contrast Routine  6/29/2019 6/29/2018            Who to contact     Normal or non-critical lab and imaging results will be communicated to you by Shopohart, letter or phone within 4 business days after the clinic has received the results. If you do not hear from us within 7 days, please contact the clinic through [employer] or phone. If you have a critical or abnormal lab result, we will notify you by phone as soon as possible.  Submit refill requests through [employer] or call your pharmacy and they will forward the refill request to us. Please allow 3 business days for your refill to be completed.          If you need to speak with a  for additional information , please call: [phone]             Additional Information About Your Visit        Loudie Information     Loudie gives you secure access to your electronic health record. If you see a primary care provider, you can also send messages to your care team and make appointments. If you have questions, please call your primary care clinic.  If you do not have a primary care provider, please call [phone] and they will assist you.        Care EveryWhere ID     This is your Care EveryWhere ID. This could be used by other organizations to access your [facility] medical records  [id number]        Your Vitals Were     Pulse Temperature Respirations BMI (Body Mass Index)          74 97.8  F (36.6  C) (Tympanic) 16 36.48 kg/m2         Blood Pressure from Last 3 Encounters:   06/29/18 149/79   04/03/18 122/90   07/28/17 (!) 152/100    Weight from Last 3 Encounters:   06/29/18 269 lb (122 kg)   04/03/18 244  lb (110.7 kg)   07/28/17 259 lb (117.5 kg)                 Today's Medication Changes          These changes are accurate as of 6/29/18  3:25 PM.  If you have any questions, ask your nurse or doctor.               Start taking these medicines.        Dose/Directions    ciprofloxacin 500 MG tablet   Commonly known as:  CIPRO   Used for:  Abdominal pain, left lower quadrant   Started by:  Antwon Scott MD        Dose:  500 mg   Take 1 tablet (500 mg) by mouth 2 times daily   Quantity:  14 tablet   Refills:  0       metroNIDAZOLE 250 MG tablet   Commonly known as:  FLAGYL   Used for:  Abdominal pain, left lower quadrant   Started by:  Antwon Scott MD        Dose:  500 mg   Take 2 tablets (500 mg) by mouth 2 times daily   Quantity:  28 tablet   Refills:  0            Where to get your medicines      These medications were sent to [facility] [place], [place] - [address]  [address] [facility] [place] [address]     Phone:  [phone]     ciprofloxacin 500 MG tablet    metroNIDAZOLE 250 MG tablet                Primary Care Provider Office Phone # Fax #    Elsa Hutchinson [phone] [address] [place] [address]        Equal Access to Services     JES BLAIR AH: Hadii saul ku hadasho Soomaali, waaxda luqadaha, qaybta kaalmada adeegyada, alexandro steve haydianne medley. So [facility] [phone].    ATENCIÓN: Si habla español, tiene a bell disposición servicios gratuitos de asistencia lingüística. Llame al [phone].    We comply with applicable federal civil rights laws and [place] laws. We do not discriminate on the basis of race, color, national origin, age, disability, sex, sexual orientation, or gender identity.            Thank you!     Thank you for choosing [facility]  for your care. Our goal is always to provide you with excellent care. Hearing back from our patients is one way we can continue to improve our  services. Please take a few minutes to complete the written survey that you may receive in the mail after your visit with us. Thank you!             Your Updated Medication List - Protect others around you: Learn how to safely use, store and throw away your medicines at www.disposemymeds.org.          This list is accurate as of 6/29/18  3:25 PM.  Always use your most recent med list.                   Brand Name Dispense Instructions for use Diagnosis    aspirin 81 MG tablet     30 tablet    Take 1 tablet (81 mg) by mouth daily    Coronary artery disease involving native coronary artery of native heart without angina pectoris       atorvastatin 40 MG tablet    LIPITOR    90 tablet    Take 1 tablet (40 mg) by mouth daily    Hyperlipidemia LDL goal <70       ciprofloxacin 500 MG tablet    CIPRO    14 tablet    Take 1 tablet (500 mg) by mouth 2 times daily    Abdominal pain, left lower quadrant       clopidogrel 75 MG tablet    PLAVIX    90 tablet    TAKE ONE TABLET BY MOUTH EVERY DAY    NSTEMI (non-ST elevated myocardial infarction) (H)       fluticasone 50 MCG/ACT spray    FLONASE    16 g    Spray 1-2 sprays into both nostrils daily    Chronic maxillary sinusitis       metoprolol succinate 25 MG 24 hr tablet    TOPROL-XL    45 tablet    TAKE ONE-HALF TABLET BY MOUTH DAILY    NSTEMI (non-ST elevated myocardial infarction) (H)       metroNIDAZOLE 250 MG tablet    FLAGYL    28 tablet    Take 2 tablets (500 mg) by mouth 2 times daily    Abdominal pain, left lower quadrant       nitroGLYcerin 0.4 MG sublingual tablet    NITROSTAT    25 tablet    For chest pain place 1 tablet under the tongue every 5 minutes for 3 doses. If symptoms persist 5 minutes after 1st dose call 911.    Unstable angina pectoris (H)       PROBIOTIC ADVANCED PO

## 2018-06-29 NOTE — LETTER
My Depression Action Plan  Name: Aries Hilario   Date of Birth 1955  Date: 6/29/2018    My doctor: Elsa Hutchinson   My clinic: [address]  [place] [id number] [phone]          GREEN    ZONE   Good Control    What it looks like:     Things are going generally well. You have normal up s and down s. You may even feel depressed from time to time, but bad moods usually last less than a day.   What you need to do:  1. Continue to care for yourself (see self care plan)  2. Check your depression survival kit and update it as needed  3. Follow your physician s recommendations including any medication.  4. Do not stop taking medication unless you consult with your physician first.           YELLOW         ZONE Getting Worse    What it looks like:     Depression is starting to interfere with your life.     It may be hard to get out of bed; you may be starting to isolate yourself from others.    Symptoms of depression are starting to last most all day and this has happened for several days.     You may have suicidal thoughts but they are not constant.   What you need to do:     1. Call your care team, your response to treatment will improve if you keep your care team informed of your progress. Yellow periods are signs an adjustment may need to be made.     2. Continue your self-care, even if you have to fake it!    3. Talk to someone in your support network    4. Open up your depression survival kit           RED    ZONE Medical Alert - Get Help    What it looks like:     Depression is seriously interfering with your life.     You may experience these or other symptoms: You can t get out of bed most days, can t work or engage in other necessary activities, you have trouble taking care of basic hygiene, or basic responsibilities, thoughts of suicide or death that will not go away, self-injurious behavior.     What you need to do:  1. Call your care team and  request a same-day appointment. If they are not available (weekends or after hours) call your local crisis line, emergency room or 911.            Depression Self Care Plan / Survival Kit    Self-Care for Depression  Here s the deal. Your body and mind are really not as separate as most people think.  What you do and think affects how you feel and how you feel influences what you do and think. This means if you do things that people who feel good do, it will help you feel better.  Sometimes this is all it takes.  There is also a place for medication and therapy depending on how severe your depression is, so be sure to consult with your medical provider and/ or Behavioral Health Consultant if your symptoms are worsening or not improving.     In order to better manage my stress, I will:    Exercise  Get some form of exercise, every day. This will help reduce pain and release endorphins, the  feel good  chemicals in your brain. This is almost as good as taking antidepressants!  This is not the same as joining a gym and then never going! (they count on that by the way ) It can be as simple as just going for a walk or doing some gardening, anything that will get you moving.      Hygiene   Maintain good hygiene (Get out of bed in the morning, Make your bed, Brush your teeth, Take a shower, and Get dressed like you were going to work, even if you are unemployed).  If your clothes don't fit try to get ones that do.    Diet  I will strive to eat foods that are good for me, drink plenty of water, and avoid excessive sugar, caffeine, alcohol, and other mood-altering substances.  Some foods that are helpful in depression are: complex carbohydrates, B vitamins, flaxseed, fish or fish oil, fresh fruits and vegetables.    Psychotherapy  I agree to participate in Individual Therapy (if recommended).    Medication  If prescribed medications, I agree to take them.  Missing doses can result in serious side effects.  I understand that  drinking alcohol, or other illicit drug use, may cause potential side effects.  I will not stop my medication abruptly without first discussing it with my provider.    Staying Connected With Others  I will stay in touch with my friends, family members, and my primary care provider/team.    Use your imagination  Be creative.  We all have a creative side; it doesn t matter if it s oil painting, sand castles, or mud pies! This will also kick up the endorphins.    Witness Beauty  (AKA stop and smell the roses) Take a look outside, even in mid-winter. Notice colors, textures. Watch the squirrels and birds.     Service to others  Be of service to others.  There is always someone else in need.  By helping others we can  get out of ourselves  and remember the really important things.  This also provides opportunities for practicing all the other parts of the program.    Humor  Laugh and be silly!  Adjust your TV habits for less news and crime-drama and more comedy.    Control your stress  Try breathing deep, massage therapy, biofeedback, and meditation. Find time to relax each day.     My support system    Clinic Contact:  Phone number:    Contact 1:  Phone number:    Contact 2:  Phone number:    Sabianism/:  Phone number:    Therapist:  Phone number:    Local crisis center:    Phone number:    Other community support:  Phone number:

## 2018-06-30 ASSESSMENT — ANXIETY QUESTIONNAIRES: GAD7 TOTAL SCORE: 4

## 2018-06-30 ASSESSMENT — PATIENT HEALTH QUESTIONNAIRE - PHQ9: SUM OF ALL RESPONSES TO PHQ QUESTIONS 1-9: 1

## 2018-09-05 DIAGNOSIS — E78.5 HYPERLIPIDEMIA LDL GOAL <70: ICD-10-CM

## 2018-09-05 NOTE — TELEPHONE ENCOUNTER
"Requested Prescriptions   Pending Prescriptions Disp Refills     atorvastatin (LIPITOR) 40 MG tablet [Pharmacy Med Name: ATORVASTATIN CALCIUM 40MG TABS] 90 tablet 3    Last Written Prescription Date:  7/6/17  Last Fill Quantity: 90,  # refills: 3   Last office visit: 12/19/2016 with prescribing provider:  12/19/16 filippo   Future Office Visit:     Sig: TAKE ONE TABLET BY MOUTH EVERY DAY    Statins Protocol Failed    9/5/2018 11:00 AM       Failed - LDL on file in past 12 months    Recent Labs   Lab Test  12/19/16   0837   LDL  161*            Failed - Recent (12 mo) or future (30 days) visit within the authorizing provider's specialty    Patient had office visit in the last 12 months or has a visit in the next 30 days with authorizing provider or within the authorizing provider's specialty.  See \"Patient Info\" tab in inbasket, or \"Choose Columns\" in Meds & Orders section of the refill encounter.           Passed - No abnormal creatine kinase in past 12 months    Recent Labs   Lab Test  08/03/10   0730   CKT  103               Passed - Patient is age 18 or older          "

## 2018-09-06 RX ORDER — ATORVASTATIN CALCIUM 40 MG/1
TABLET, FILM COATED ORAL
Qty: 30 TABLET | Refills: 0 | Status: SHIPPED | OUTPATIENT
Start: 2018-09-06 | End: 2018-09-12

## 2018-09-06 NOTE — TELEPHONE ENCOUNTER
"Medication is being filled for 1 time refill only due to:  Patient needs labs Lipids.   Last 4 Office Visits:  - 6/29/18 with Dr. Scott in [place] (pt says he does not recall seeing this doctor)  -12/19/16 with WILLY Sumner CNP in [place]  - 10/17/16 with CAMRYN Munguia PA-C in [facility]   - 9/7/16 with Dr. Hutchinson in [facility]    \"I've been out of my medicine since Monday.\" > note this was Labor Day <  Informed pt [facility] received request 9/5/18 at 11am, and [facility] has 72 hours to refill Rx.  Advised pt not to wait until he takes his last pill before requesting a refill.  An appt was made to see Dr. Hutchinson 9/12/18 and pt told to come fasting.      Allyssa CARIAS RN      "

## 2018-09-06 NOTE — TELEPHONE ENCOUNTER
I have attempted to contact this patient on cell phone with the following results: left message to return my call on answering machine.  Who is PCP?  Needs Office Visit and labs.    Allyssa CARIAS RN

## 2018-09-06 NOTE — PROGRESS NOTES
SUBJECTIVE:   Aries Hilario is a 63 year old male who presents to clinic today for the following health issues:    - He states that he does not know what medications if any that he takes    Hyperlipidemia Follow-Up  Atorvastatin 40mg qd    Rate your low fat/cholesterol diet?: not monitoring fat    Taking statin?  He is unsure if he takes this    Other lipid medications/supplements?:  none    Heart Failure Follow-up  Plavix 75mg every day, metoprolol 25mg qd    Symptoms:    Shortness of breath: none    Lower extremity edema: none    Chest pain: Yes-  Pressure in left side of chest    Using more pillows than normal: No    Cough at night: No    Weight:    Checking weight daily: No    Weight change: Unsure, he states that his scale only goes to 250lb    Cardiology visits, ER/UC, or hospital admissions since last visit: None    Medication side effects: Nausea if he takes medications on empty stomach    Anxiety Follow-Up  No current medication    Status since last visit: No change    Other associated symptoms:None    Complicating factors:   Significant life event: No   Current substance abuse: None  Depression symptoms: No  ALEX-7 SCORE 11/21/2014 12/19/2016 6/29/2018   Total Score 7 - -   Total Score - 0 4       ALEX-7        Amount of exercise or physical activity: None outside of when at work    Problems taking medications regularly: He states he is unsure what he takes    Medication side effects: Nausea if taking medications on empty stomach    Diet: regular (no restrictions)      Problem list and histories reviewed & adjusted, as indicated.  Additional history: as documented    Patient Active Problem List   Diagnosis     Hepatitis C, chronic, in remission     Cannabis abuse, continuous     Thrombocytopenia due to drugs     Drug-induced leukopenia (H)     Anemia     Anxiety     GERD (gastroesophageal reflux disease)     Low back pain     NSTEMI (non-ST elevated myocardial infarction) (H)     Advanced directives,  counseling/discussion     Hyperlipidemia LDL goal <70     Foreign body aspiration     Gastrointestinal hemorrhage with melena     Mild TBI (traumatic brain injury), with loss of consciousness of 30 minutes or less, subsequent encounter     Dizziness     Peripheral polyneuropathy     Abnormal weight gain     Urinary retention     Duodenal ulcer     H. pylori infection     Duodenal ulcer due to Helicobacter pylori     Coronary artery disease involving native coronary artery of native heart, angina presence unspecified     Obesity (BMI 35.0-39.9) with comorbidity (H)     Past Surgical History:   Procedure Laterality Date     COLONOSCOPY N/A 9/29/2014    Procedure: COLONOSCOPY;  Surgeon: Néstor Steele MD;  Location: [facility]     HAND SURGERY       LAPAROSCOPIC HERNIORRHAPHY INGUINAL BILATERAL Bilateral 12/1/2014    Procedure: LAPAROSCOPIC HERNIORRHAPHY INGUINAL BILATERAL;  Surgeon: Navid Almendarez MD;  Location: [facility]       Social History   Substance Use Topics     Smoking status: Former Smoker     Quit date: 1/1/2005     Smokeless tobacco: Never Used     Alcohol use No     Family History   Problem Relation Age of Onset     Depression Father      suicide     Coronary Artery Disease Brother      MI in his 60s     Asthma No family hx of      Diabetes No family hx of      Hypertension No family hx of      Cerebrovascular Disease No family hx of      Cancer - colorectal No family hx of      Prostate Cancer No family hx of            Reviewed and updated as needed this visit by clinical staff       Reviewed and updated as needed this visit by Provider         ROS:  Constitutional, HEENT, cardiovascular, pulmonary, gi and gu systems are negative, except as otherwise noted.    OBJECTIVE:     /80  Pulse 70  Ht 6' (1.829 m)  Wt 270 lb 6 oz (122.6 kg)  BMI 36.67 kg/m2  Body mass index is 36.67 kg/(m^2).   GENERAL: Healthy, alert and no distress  EYES: Eyes grossly normal to inspection, conjunctivae and sclerae  normal  RESP: Lungs clear to auscultation - no rales, rhonchi or wheezes  CV: Regular rate and rhythm, normal S1 S2, no murmur  MS: No gross musculoskeletal defects noted, no edema  NEURO: Normal strength and tone, mentation intact and speech normal  PSYCH: Mentation appears normal, affect normal/bright     Results for orders placed or performed in visit on 09/12/18   Lipid panel reflex to direct LDL Fasting   Result Value Ref Range    Cholesterol 147 <200 mg/dL    Triglycerides 103 <150 mg/dL    HDL Cholesterol 36 (L) >39 mg/dL    LDL Cholesterol Calculated 90 <100 mg/dL    Non HDL Cholesterol 111 <130 mg/dL   Basic metabolic panel   Result Value Ref Range    Sodium 137 133 - 144 mmol/L    Potassium 4.6 3.4 - 5.3 mmol/L    Chloride 103 94 - 109 mmol/L    Carbon Dioxide 32 20 - 32 mmol/L    Anion Gap 2 (L) 3 - 14 mmol/L    Glucose 108 (H) 70 - 99 mg/dL    Urea Nitrogen 23 7 - 30 mg/dL    Creatinine 1.02 0.66 - 1.25 mg/dL    GFR Estimate 74 >60 mL/min/1.7m2    GFR Estimate If Black 89 >60 mL/min/1.7m2    Calcium 8.6 8.5 - 10.1 mg/dL   TSH with free T4 reflex   Result Value Ref Range    TSH 1.30 0.40 - 4.00 mU/L        ASSESSMENT/PLAN:     (I25.10) Coronary artery disease involving native coronary artery of native heart, angina presence unspecified  (primary encounter diagnosis)  Comment: No current symptoms.  Patient appears to be on appropriate medication.  Antiplatelet therapy, high intensity statin therapy, and beta-blocker.  Plan: Continue current medications.  No indication at this time for stress testing.    (E78.5) Hyperlipidemia LDL goal <70  Comment: LDL still above goal at 90.  Otherwise lipid panel acceptable, low HDL.  Plan: Lipid panel reflex to direct LDL Fasting, Basic        metabolic panel, atorvastatin (LIPITOR) 40 MG         tablet        1 year refill on atorvastatin.    (E66.01) Morbid obesity (H)  Comment: Normal thyroid function.  Reviewed lifestyle.  Plan: TSH with free T4 reflex      (J32.0)  Chronic maxillary sinusitis  Comment: Stressed the importance of consistent use of his nasal steroid spray.  Symptoms typically worse in the wintertime.  Probable indoor allergy.  Plan: fluticasone (FLONASE) 50 MCG/ACT spray    (I21.4) NSTEMI (non-ST elevated myocardial infarction) (H)  Comment: See above.  Plan: metoprolol succinate (TOPROL-XL) 25 MG 24 hr         tablet          Patient Instructions   *   I would recommend using the Flonase nose spray every day throughout the winter. Start November 1. I sent a prescription for the nose spray.     *   Okay to try the humidifier.     *   You don't need to take the aspirin as long as you take the clopidogrel.     *    See what your body does, it might need some help.     *   A yearly check up before your medications run out.     *   I refilled your medications for one year.                    Elsa Hutchinson MD  [facility]

## 2018-09-07 ENCOUNTER — TELEPHONE (OUTPATIENT)
Dept: LAB | Facility: CLINIC | Age: 63
End: 2018-09-07

## 2018-09-07 DIAGNOSIS — Z12.5 SCREENING FOR PROSTATE CANCER: Primary | ICD-10-CM

## 2018-09-07 NOTE — TELEPHONE ENCOUNTER
Patient coming in for lab work on Wednesday, 9/12/18.    Patient will be fasting.       Please place future orders. Thanks

## 2018-09-12 ENCOUNTER — OFFICE VISIT (OUTPATIENT)
Dept: FAMILY MEDICINE | Facility: CLINIC | Age: 63
End: 2018-09-12
Payer: COMMERCIAL

## 2018-09-12 VITALS
HEIGHT: 72 IN | SYSTOLIC BLOOD PRESSURE: 128 MMHG | HEART RATE: 70 BPM | DIASTOLIC BLOOD PRESSURE: 80 MMHG | BODY MASS INDEX: 36.62 KG/M2 | WEIGHT: 270.38 LBS

## 2018-09-12 DIAGNOSIS — E66.01 MORBID OBESITY (H): ICD-10-CM

## 2018-09-12 DIAGNOSIS — I25.10 CORONARY ARTERY DISEASE INVOLVING NATIVE CORONARY ARTERY OF NATIVE HEART, ANGINA PRESENCE UNSPECIFIED: ICD-10-CM

## 2018-09-12 DIAGNOSIS — J32.0 CHRONIC MAXILLARY SINUSITIS: ICD-10-CM

## 2018-09-12 DIAGNOSIS — E78.5 HYPERLIPIDEMIA LDL GOAL <70: ICD-10-CM

## 2018-09-12 DIAGNOSIS — E78.5 HYPERLIPIDEMIA LDL GOAL <70: Primary | ICD-10-CM

## 2018-09-12 DIAGNOSIS — Z12.5 SCREENING FOR PROSTATE CANCER: ICD-10-CM

## 2018-09-12 DIAGNOSIS — I25.10 CORONARY ARTERY DISEASE INVOLVING NATIVE CORONARY ARTERY OF NATIVE HEART, ANGINA PRESENCE UNSPECIFIED: Primary | ICD-10-CM

## 2018-09-12 DIAGNOSIS — I21.4 NSTEMI (NON-ST ELEVATED MYOCARDIAL INFARCTION) (H): ICD-10-CM

## 2018-09-12 LAB
ANION GAP SERPL CALCULATED.3IONS-SCNC: 2 MMOL/L (ref 3–14)
BUN SERPL-MCNC: 23 MG/DL (ref 7–30)
CALCIUM SERPL-MCNC: 8.6 MG/DL (ref 8.5–10.1)
CHLORIDE SERPL-SCNC: 103 MMOL/L (ref 94–109)
CHOLEST SERPL-MCNC: 147 MG/DL
CO2 SERPL-SCNC: 32 MMOL/L (ref 20–32)
CREAT SERPL-MCNC: 1.02 MG/DL (ref 0.66–1.25)
GFR SERPL CREATININE-BSD FRML MDRD: 74 ML/MIN/1.7M2
GLUCOSE SERPL-MCNC: 108 MG/DL (ref 70–99)
HDLC SERPL-MCNC: 36 MG/DL
LDLC SERPL CALC-MCNC: 90 MG/DL
NONHDLC SERPL-MCNC: 111 MG/DL
POTASSIUM SERPL-SCNC: 4.6 MMOL/L (ref 3.4–5.3)
PSA SERPL-ACNC: 0.9 UG/L (ref 0–4)
SODIUM SERPL-SCNC: 137 MMOL/L (ref 133–144)
TRIGL SERPL-MCNC: 103 MG/DL
TSH SERPL DL<=0.005 MIU/L-ACNC: 1.3 MU/L (ref 0.4–4)

## 2018-09-12 PROCEDURE — 84443 ASSAY THYROID STIM HORMONE: CPT | Performed by: FAMILY MEDICINE

## 2018-09-12 PROCEDURE — 80048 BASIC METABOLIC PNL TOTAL CA: CPT | Performed by: FAMILY MEDICINE

## 2018-09-12 PROCEDURE — 99214 OFFICE O/P EST MOD 30 MIN: CPT | Performed by: FAMILY MEDICINE

## 2018-09-12 PROCEDURE — G0103 PSA SCREENING: HCPCS | Performed by: FAMILY MEDICINE

## 2018-09-12 PROCEDURE — 80061 LIPID PANEL: CPT | Performed by: FAMILY MEDICINE

## 2018-09-12 PROCEDURE — 36415 COLL VENOUS BLD VENIPUNCTURE: CPT | Performed by: FAMILY MEDICINE

## 2018-09-12 RX ORDER — ATORVASTATIN CALCIUM 40 MG/1
40 TABLET, FILM COATED ORAL DAILY
Qty: 90 TABLET | Refills: 3 | Status: SHIPPED | OUTPATIENT
Start: 2018-09-12 | End: 2019-07-30

## 2018-09-12 RX ORDER — FLUTICASONE PROPIONATE 50 MCG
1-2 SPRAY, SUSPENSION (ML) NASAL DAILY
Qty: 48 G | Refills: 3 | Status: SHIPPED | OUTPATIENT
Start: 2018-09-12

## 2018-09-12 RX ORDER — METOPROLOL SUCCINATE 25 MG/1
TABLET, EXTENDED RELEASE ORAL
Qty: 45 TABLET | Refills: 3 | Status: SHIPPED | OUTPATIENT
Start: 2018-09-12 | End: 2019-07-30

## 2018-09-12 NOTE — PATIENT INSTRUCTIONS
*   I would recommend using the Flonase nose spray every day throughout the winter. Start November 1. I sent a prescription for the nose spray.     *   Okay to try the humidifier.     *   You don't need to take the aspirin as long as you take the clopidogrel.     *    See what your body does, it might need some help.     *   A yearly check up before your medications run out.     *   I refilled your medications for one year.

## 2018-09-12 NOTE — LETTER
October 1, 2018      Aries Hilario  [address]  [place] [address]        Dear ,    We are writing to inform you of your test results.    I see that you had not checked your test results using our online system, [employer].  Enclosed you will find a copy of these test results.  They show that your cholesterol is very good.  I would recommend that you continue on your cholesterol-lowering medication, atorvastatin.    Also, you have normal kidney and thyroid function.     As we talked about, you may stop the aspirin.  I would recommend a yearly physical.  Please call, or use [employer], with any questions or concerns.    Resulted Orders   Lipid panel reflex to direct LDL Fasting   Result Value Ref Range    Cholesterol 147 <200 mg/dL    Triglycerides 103 <150 mg/dL    HDL Cholesterol 36 (L) >39 mg/dL    LDL Cholesterol Calculated 90 <100 mg/dL      Comment:      Desirable:       <100 mg/dl    Non HDL Cholesterol 111 <130 mg/dL   Basic metabolic panel   Result Value Ref Range    Sodium 137 133 - 144 mmol/L    Potassium 4.6 3.4 - 5.3 mmol/L    Chloride 103 94 - 109 mmol/L    Carbon Dioxide 32 20 - 32 mmol/L    Anion Gap 2 (L) 3 - 14 mmol/L    Glucose 108 (H) 70 - 99 mg/dL    Urea Nitrogen 23 7 - 30 mg/dL    Creatinine 1.02 0.66 - 1.25 mg/dL    GFR Estimate 74 >60 mL/min/1.7m2      Comment:      Non  GFR Calc    GFR Estimate If Black 89 >60 mL/min/1.7m2      Comment:       GFR Calc    Calcium 8.6 8.5 - 10.1 mg/dL   TSH with free T4 reflex   Result Value Ref Range    TSH 1.30 0.40 - 4.00 mU/L       If you have any questions or concerns, please call the clinic at the number listed above.       Sincerely,        Elsa Hutchinson MD

## 2018-09-12 NOTE — MR AVS SNAPSHOT
After Visit Summary   9/12/2018    Aries Hilario    MRN: [id number]           Patient Information     Date Of Birth          1955        Visit Information        Provider Department      9/12/2018 10:20 AM Elsa Hutchinson MD [facility]        Today's Diagnoses     Coronary artery disease involving native coronary artery of native heart, angina presence unspecified    -  1    Hyperlipidemia LDL goal <70        Morbid obesity (H)        Chronic maxillary sinusitis        NSTEMI (non-ST elevated myocardial infarction) (H)          Care Instructions    *   I would recommend using the Flonase nose spray every day throughout the winter. Start November 1. I sent a prescription for the nose spray.     *   Okay to try the humidifier.     *   You don't need to take the aspirin as long as you take the clopidogrel.     *    See what your body does, it might need some help.     *   A yearly check up before your medications run out.     *   I refilled your medications for one year.               Follow-ups after your visit        Follow-up notes from your care team     Return in about 1 year (around 9/12/2019).      Future tests that were ordered for you today     Open Future Orders        Priority Expected Expires Ordered    Lipid panel reflex to direct LDL Fasting Routine  9/10/2019 9/10/2018    Basic metabolic panel  (Ca, Cl, CO2, Creat, Gluc, K, Na, BUN) Routine  9/10/2019 9/10/2018            Who to contact     Normal or non-critical lab and imaging results will be communicated to you by MyChart, letter or phone within 4 business days after the clinic has received the results. If you do not hear from us within 7 days, please contact the clinic through MyChart or phone. If you have a critical or abnormal lab result, we will notify you by phone as soon as possible.  Submit refill requests through [employer] or call your pharmacy and they will forward the refill request to us. Please allow 3  business days for your refill to be completed.          If you need to speak with a  for additional information , please call: [phone]           Additional Information About Your Visit        [employer] Information     [employer] gives you secure access to your electronic health record. If you see a primary care provider, you can also send messages to your care team and make appointments. If you have questions, please call your primary care clinic.  If you do not have a primary care provider, please call [phone] and they will assist you.        Care EveryWhere ID     This is your Care EveryWhere ID. This could be used by other organizations to access your [place] medical records  [id number]        Your Vitals Were     Pulse Height BMI (Body Mass Index)             70 6' (1.829 m) 36.67 kg/m2          Blood Pressure from Last 3 Encounters:   09/12/18 128/80   06/29/18 149/79   04/03/18 122/90    Weight from Last 3 Encounters:   09/12/18 270 lb 6 oz (122.6 kg)   06/29/18 269 lb (122 kg)   04/03/18 244 lb (110.7 kg)              We Performed the Following     Basic metabolic panel     Lipid panel reflex to direct LDL Fasting          Today's Medication Changes          These changes are accurate as of 9/12/18 10:46 AM.  If you have any questions, ask your nurse or doctor.               These medicines have changed or have updated prescriptions.        Dose/Directions    atorvastatin 40 MG tablet   Commonly known as:  LIPITOR   This may have changed:  See the new instructions.   Used for:  Hyperlipidemia LDL goal <70   Changed by:  Elsa Hutchinson MD        Dose:  40 mg   Take 1 tablet (40 mg) by mouth daily   Quantity:  90 tablet   Refills:  3         Stop taking these medicines if you haven't already. Please contact your care team if you have questions.     ciprofloxacin 500 MG tablet   Commonly known as:  CIPRO   Stopped by:  Elsa Hutchinson MD           metroNIDAZOLE 250 MG tablet    Commonly known as:  FLAGYL   Stopped by:  Elsa Hutchinson MD                Where to get your medicines      These medications were sent to [facility] - [place], [place] - [address] [facility] [address]  [address] [place], [facility] [address]     Phone:  [phone]     atorvastatin 40 MG tablet    fluticasone 50 MCG/ACT spray    metoprolol succinate 25 MG 24 hr tablet                Primary Care Provider Office Phone # Fax #    Elsa Hutchinson [phone] [address] [place] [address]        Equal Access to Services     [facility]: Hadii aad ku hadasho Soomaali, waaxda luqadaha, qaybta kaalmada adeegyada, waxay idiin hayaan adearyan spring . So [facility] [phone].    ATENCIÓN: Si habla español, tiene a bell disposición servicios gratuitos de asistencia lingüística. [facility] [phone].    We comply with applicable federal civil rights laws and [place] laws. We do not discriminate on the basis of race, color, national origin, age, disability, sex, sexual orientation, or gender identity.            Thank you!     Thank you for choosing [facility]  for your care. Our goal is always to provide you with excellent care. Hearing back from our patients is one way we can continue to improve our services. Please take a few minutes to complete the written survey that you may receive in the mail after your visit with us. Thank you!             Your Updated Medication List - Protect others around you: Learn how to safely use, store and throw away your medicines at www.disposemymeds.org.          This list is accurate as of 9/12/18 10:46 AM.  Always use your most recent med list.                   Brand Name Dispense Instructions for use Diagnosis    aspirin 81 MG tablet     30 tablet    Take 1 tablet (81 mg) by mouth daily    Coronary artery disease involving native coronary artery of native heart without angina pectoris       atorvastatin  40 MG tablet    LIPITOR    90 tablet    Take 1 tablet (40 mg) by mouth daily    Hyperlipidemia LDL goal <70       clopidogrel 75 MG tablet    PLAVIX    90 tablet    TAKE ONE TABLET BY MOUTH EVERY DAY    NSTEMI (non-ST elevated myocardial infarction) (H)       fluticasone 50 MCG/ACT spray    FLONASE    48 g    Spray 1-2 sprays into both nostrils daily    Chronic maxillary sinusitis       metoprolol succinate 25 MG 24 hr tablet    TOPROL-XL    45 tablet    TAKE ONE-HALF TABLET BY MOUTH DAILY    NSTEMI (non-ST elevated myocardial infarction) (H)       nitroGLYcerin 0.4 MG sublingual tablet    NITROSTAT    25 tablet    For chest pain place 1 tablet under the tongue every 5 minutes for 3 doses. If symptoms persist 5 minutes after 1st dose call 911.    Unstable angina pectoris (H)       PROBIOTIC ADVANCED PO

## 2018-10-06 DIAGNOSIS — I21.4 NSTEMI (NON-ST ELEVATED MYOCARDIAL INFARCTION) (H): ICD-10-CM

## 2018-10-08 RX ORDER — CLOPIDOGREL BISULFATE 75 MG/1
TABLET ORAL
Qty: 90 TABLET | Refills: 1 | Status: SHIPPED | OUTPATIENT
Start: 2018-10-08 | End: 2019-04-10

## 2018-10-08 NOTE — TELEPHONE ENCOUNTER
"Requested Prescriptions   Pending Prescriptions Disp Refills     clopidogrel (PLAVIX) 75 MG tablet [Pharmacy Med Name: CLOPIDOGREL BISULFATE 75MG TABS] 90 tablet 3    Last Written Prescription Date:  10/11/2017 #90 x 3  Last filled 07/20/2018  Last office visit: 9/12/2018 ABIGAIL Hutchinson   Future Office Visit:  None   Sig: TAKE ONE TABLET BY MOUTH EVERY DAY    Plavix Failed    10/6/2018  9:22 AM       Failed - Normal HGB on file in past 12 months    Recent Labs   Lab Test  07/28/17   1142   HGB  16.4              Failed - Normal Platelets on file in past 12 months    Recent Labs   Lab Test  07/28/17   1142   PLT  197              Passed - No active PPI on record unless is Protonix       Passed - Recent (12 mo) or future (30 days) visit within the authorizing provider's specialty    Patient had office visit in the last 12 months or has a visit in the next 30 days with authorizing provider or within the authorizing provider's specialty.  See \"Patient Info\" tab in inbasket, or \"Choose Columns\" in Meds & Orders section of the refill encounter.           Passed - Patient is age 18 or older          "

## 2019-07-05 NOTE — PROGRESS NOTES
SUBJECTIVE:   CC: Aries Hilario is an 64 year old male who presents for preventive health visit.     He presents today with his spouse, significant discord was noted between the couple during the course of his visit.    Healthy Habits:    Do you get at least three servings of calcium containing foods daily (dairy, green leafy vegetables, etc.)? yes    Amount of exercise or daily activities, outside of work: 2-3 day(s) per week    Problems taking medications regularly No    Medication side effects: Yes wife states numerous side effects, chest pain, SOB, cough and fatigue    Have you had an eye exam in the past two years? no    Do you see a dentist twice per year? yes    Do you have sleep apnea, excessive snoring or daytime drowsiness?yes, drowsiness      Hyperlipidemia Follow-Up  Atorvastatin 40mg every day     Are you having any of the following symptoms? (Select all that apply)  Chest pain or pressure    Are you regularly taking any medication or supplement to lower your cholesterol?   No    Are you having muscle aches or other side effects that you think could be caused by your cholesterol lowering medication?  No      Heart Failure Follow-up  Plavix 75mg every day, metoprolol 12.5mg every day   Are you experiencing any shortness of breath? Yes, with rest and activity How would you describe your shortness of breath?  Intermittently also with some chest pain        Are you experiencing any swelling in your legs or feet?  No    Are you using more pillows than usual? No    Do you cough at night?  No    Do you check your weight daily?  Yes    Have you had a weight change recently?  Weight increase    Are you having any of the following side effects from your medications? (Select all that apply)  Fatigue and Cough    Since your last visit, how many times have you gone to the cardiologist, urgent care, emergency room, or hospital because of your heart failure?   None      Fatigue  Patient notes takes a lot of naps.  If goes outside in the sun, gets sleepy.      Chest Pain/SOB  Patient scheduled to see cardiology.     Nasal congestion  Patient notes difficulty breathing through nose. Seen by ENT advised to use Flonase. Patient has not tried therapy.      Today's PHQ-2 Score:   PHQ-2 (  [employer]) 2016   Q1: Little interest or pleasure in doing things 0 0   Q2: Feeling down, depressed or hopeless 0 0   PHQ-2 Score 0 0       Abuse: Current or Past(Physical, Sexual or Emotional)- No  Do you feel safe in your environment? Yes    Social History     Tobacco Use     Smoking status: Former Smoker     Last attempt to quit: 2005     Years since quittin.5     Smokeless tobacco: Never Used   Substance Use Topics     Alcohol use: No     If you drink alcohol do you typically have >3 drinks per day or >7 drinks per week? No                      Last PSA:   PSA   Date Value Ref Range Status   2018 0.90 0 - 4 ug/L Final     Comment:     Assay Method:  Chemiluminescence using Siemens Vista analyzer       Reviewed orders with patient. Reviewed health maintenance and updated orders accordingly - Yes  Labs reviewed in EPIC    Reviewed and updated as needed this visit by clinical staff  Tobacco  Meds  Med Hx  Surg Hx  Fam Hx  Soc Hx        Reviewed and updated as needed this visit by Provider          ROS:  CONSTITUTIONAL: POSITIVE for weight gain  INTEGUMENTARY/SKIN: NEGATIVE for worrisome rashes, moles or lesions  EYES: NEGATIVE for vision changes or irritation  ENT: POSITIVE for snoring and nasal congestion  RESP: POSITIVE for SOB  CV: POSITIVE for chest pain  GI: NEGATIVE for nausea, abdominal pain, heartburn, or change in bowel habits  MUSCULOSKELETAL: NEGATIVE for significant arthralgias or myalgia  NEURO: NEGATIVE for weakness, dizziness or paresthesias  ENDOCRINE: POSITIVE for heat intolerance  PSYCHIATRIC: POSITIVE for daytime drowsiness and fatigue    This document serves as a record of the services and  decisions personally performed and made by Elsa Hutchinson MD. It was created on his behalf by Greg Karimi, a trained medical scribe. The creation of this document is based the provider's statements to the medical scribe.  Greg Karimi 10:18 AM July 15, 2019    OBJECTIVE:   /84   Pulse 74   Temp 97.5  F (36.4  C) (Tympanic)   Resp 14   Ht 1.829 m (6')   Wt 123.4 kg (272 lb 2 oz)   BMI 36.91 kg/m    EXAM:  GENERAL: alert, pleasant and no distress  EYES: Eyes grossly normal to inspection, conjunctivae and sclerae normal  HENT: ear canals and TM's normal, nose and mouth without ulcers or lesions  NECK: no adenopathy, no asymmetry, masses, or scars and thyroid normal to palpation  RESP: lungs clear to auscultation - no rales, rhonchi or wheezes  CV: regular rate and rhythm, normal S1 S2  ABDOMEN: soft, nontender  MS: no gross musculoskeletal defects noted, no edema  SKIN: no suspicious lesions or rashes  NEURO: Normal strength and tone, mentation intact and speech normal  PSYCH: mentation appears normal, affect normal/bright    Diagnostic Test Results:    Results for orders placed or performed in visit on 07/15/19   Albumin Random Urine Quantitative with Creat Ratio   Result Value Ref Range    Creatinine Urine 218 mg/dL    Albumin Urine mg/L 29 mg/L    Albumin Urine mg/g Cr 13.35 0 - 17 mg/g Cr   CRP, inflammation   Result Value Ref Range    CRP Inflammation <2.9 0.0 - 8.0 mg/L   Vitamin B12   Result Value Ref Range    Vitamin B12 568 193 - 986 pg/mL   Lipid panel reflex to direct LDL Fasting   Result Value Ref Range    Cholesterol 167 <200 mg/dL    Triglycerides 126 <150 mg/dL    HDL Cholesterol 43 >39 mg/dL    LDL Cholesterol Calculated 99 <100 mg/dL    Non HDL Cholesterol 124 <130 mg/dL   Hemoglobin A1c   Result Value Ref Range    Hemoglobin A1C 6.5 (H) 0 - 5.6 %   TSH with free T4 reflex   Result Value Ref Range    TSH 1.70 0.40 - 4.00 mU/L       ASSESSMENT/PLAN:   (Z00.00) Routine general medical  examination at a health care facility  (primary encounter diagnosis)  Comment: Reviewed lifestyle, current conditions, medications, routine screening and labs.  Plan: Annual physical in 1 year, sooner for other health maintenance.    (G62.9) Peripheral polyneuropathy  Comment: Normal B12 and thyroid function.  Consider lumbar radiculopathy versus idiopathic polyneuropathy.  Slowly progressing, but still patient is functional and finds his symptoms tolerable.  Plan: Vitamin B12, Hemoglobin A1c        Monitor.  Offered neurology appointment, patient will consider this.    (E78.5) Hyperlipidemia LDL goal <70  Comment: Tolerating high intensity statin therapy well.  LDL still above recommended guidelines.  Plan: Lipid panel reflex to direct LDL Fasting        Continue.  Follow-up per cardiology.    (I21.4) NSTEMI (non-ST elevated myocardial infarction) (H)  Comment: Drug-eluting stents placed 3 years ago.      (I25.10) Coronary artery disease involving native coronary artery of native heart, angina presence unspecified  Comment: Patient notes some nonspecific symptoms including exertional dyspnea and some atypical chest pain.  Last angiogram, 2 years ago, showed no occlusive disease.  Plan: Albumin Random Urine Quantitative with Creat         Ratio, CRP, inflammation, Homocysteine        Advised that he follow-up with cardiology to discuss indications for stress testing or possible angiogram.  Patient appears clinically stable can be evaluated as an outpatient.    (R53.83) Fatigue, unspecified type  Comment: Associated snoring, daytime fatigue.  Plan: SLEEP EVALUATION & MANAGEMENT REFERRAL - [facility]  [phone]        (Age 2 and up)        Refer for sleep consultation.    (R68.89) Heat intolerance  Comment: Etiology unclear, normal thyroid function.  Consider deconditioning and obesity.  Plan: TSH with free T4 reflex        Monitor.    Patient Instructions     *    See our sleep  [place]. [phone].   *   Try the generic Flonase, it's an over the counter. Use if for one month.   *    For the neuropathy, consider seeing neurologist. [phone].   *   Focus on lifestyle.   *   Will check blood tests.   *   See the cardiologist.   *     As far as depression, it's complicated.       COUNSELING:  Reviewed preventive health counseling, as reflected in patient instructions    Estimated body mass index is 36.91 kg/m  as calculated from the following:    Height as of this encounter: 1.829 m (6').    Weight as of this encounter: 123.4 kg (272 lb 2 oz).    Weight management plan: Discussed healthy diet and exercise guidelines     reports that he quit smoking about 14 years ago. He has never used smokeless tobacco.      Counseling Resources:  ATP IV Guidelines  Pooled Cohorts Equation Calculator  FRAX Risk Assessment  ICSI Preventive Guidelines  Dietary Guidelines for Americans, 2010  USDA's MyPlate  ASA Prophylaxis  Lung CA Screening    The information in this document, created by a scribe for me, accurately reflects the services I personally performed and the decisions made by me. I have reviewed and approved this document for accuracy.     Elsa Hutchinson MD  [facility]

## 2019-07-05 NOTE — PATIENT INSTRUCTIONS
Preventive Health Recommendations  Male Ages 50 - 64    *    See our sleep center. [phone].   *   Try the generic Flonase, it's an over the counter. Use if for one month.   *    For the neuropathy, consider seeing neurologist. [phone].   *   Focus on lifestyle.   *   Will check blood tests.   *   See the cardiologist.   *     As far as depression, it's complicated.   Yearly exam:             See your health care provider every year in order to  o   Review health changes.   o   Discuss preventive care.    o   Review your medicines if your doctor has prescribed any.     Have a cholesterol test every 5 years, or more frequently if you are at risk for high cholesterol/heart disease.     Have a diabetes test (fasting glucose) every three years. If you are at risk for diabetes, you should have this test more often.     Have a colonoscopy at age 50, or have a yearly FIT test (stool test). These exams will check for colon cancer.      Talk with your health care provider about whether or not a prostate cancer screening test (PSA) is right for you.    You should be tested each year for STDs (sexually transmitted diseases), if you re at risk.     Shots: Get a flu shot each year. Get a tetanus shot every 10 years.     Nutrition:    Eat at least 5 servings of fruits and vegetables daily.     Eat whole-grain bread, whole-wheat pasta and brown rice instead of white grains and rice.     Get adequate Calcium and Vitamin D.     Lifestyle    Exercise for at least 150 minutes a week (30 minutes a day, 5 days a week). This will help you control your weight and prevent disease.     Limit alcohol to one drink per day.     No smoking.     Wear sunscreen to prevent skin cancer.     See your dentist every six months for an exam and cleaning.     See your eye doctor every 1 to 2 years.

## 2019-07-15 ENCOUNTER — OFFICE VISIT (OUTPATIENT)
Dept: FAMILY MEDICINE | Facility: CLINIC | Age: 64
End: 2019-07-15
Payer: COMMERCIAL

## 2019-07-15 VITALS
SYSTOLIC BLOOD PRESSURE: 124 MMHG | TEMPERATURE: 97.5 F | HEIGHT: 72 IN | DIASTOLIC BLOOD PRESSURE: 84 MMHG | BODY MASS INDEX: 36.86 KG/M2 | WEIGHT: 272.13 LBS | RESPIRATION RATE: 14 BRPM | HEART RATE: 74 BPM

## 2019-07-15 DIAGNOSIS — R53.83 FATIGUE, UNSPECIFIED TYPE: ICD-10-CM

## 2019-07-15 DIAGNOSIS — E78.5 HYPERLIPIDEMIA LDL GOAL <70: ICD-10-CM

## 2019-07-15 DIAGNOSIS — Z00.00 ROUTINE GENERAL MEDICAL EXAMINATION AT A HEALTH CARE FACILITY: Primary | ICD-10-CM

## 2019-07-15 DIAGNOSIS — G62.9 PERIPHERAL POLYNEUROPATHY: ICD-10-CM

## 2019-07-15 DIAGNOSIS — R68.89 HEAT INTOLERANCE: ICD-10-CM

## 2019-07-15 DIAGNOSIS — I21.4 NSTEMI (NON-ST ELEVATED MYOCARDIAL INFARCTION) (H): ICD-10-CM

## 2019-07-15 DIAGNOSIS — I25.10 CORONARY ARTERY DISEASE INVOLVING NATIVE CORONARY ARTERY OF NATIVE HEART, ANGINA PRESENCE UNSPECIFIED: ICD-10-CM

## 2019-07-15 LAB
CHOLEST SERPL-MCNC: 167 MG/DL
CREAT UR-MCNC: 218 MG/DL
CRP SERPL-MCNC: <2.9 MG/L (ref 0–8)
HBA1C MFR BLD: 6.5 % (ref 0–5.6)
HDLC SERPL-MCNC: 43 MG/DL
LDLC SERPL CALC-MCNC: 99 MG/DL
MICROALBUMIN UR-MCNC: 29 MG/L
MICROALBUMIN/CREAT UR: 13.35 MG/G CR (ref 0–17)
NONHDLC SERPL-MCNC: 124 MG/DL
TRIGL SERPL-MCNC: 126 MG/DL
TSH SERPL DL<=0.005 MIU/L-ACNC: 1.7 MU/L (ref 0.4–4)
VIT B12 SERPL-MCNC: 568 PG/ML (ref 193–986)

## 2019-07-15 PROCEDURE — 80061 LIPID PANEL: CPT | Performed by: FAMILY MEDICINE

## 2019-07-15 PROCEDURE — 82607 VITAMIN B-12: CPT | Performed by: FAMILY MEDICINE

## 2019-07-15 PROCEDURE — 99396 PREV VISIT EST AGE 40-64: CPT | Performed by: FAMILY MEDICINE

## 2019-07-15 PROCEDURE — 83090 ASSAY OF HOMOCYSTEINE: CPT | Performed by: FAMILY MEDICINE

## 2019-07-15 PROCEDURE — 83036 HEMOGLOBIN GLYCOSYLATED A1C: CPT | Performed by: FAMILY MEDICINE

## 2019-07-15 PROCEDURE — 36415 COLL VENOUS BLD VENIPUNCTURE: CPT | Performed by: FAMILY MEDICINE

## 2019-07-15 PROCEDURE — 84443 ASSAY THYROID STIM HORMONE: CPT | Performed by: FAMILY MEDICINE

## 2019-07-15 PROCEDURE — 99213 OFFICE O/P EST LOW 20 MIN: CPT | Mod: 25 | Performed by: FAMILY MEDICINE

## 2019-07-15 PROCEDURE — 86140 C-REACTIVE PROTEIN: CPT | Performed by: FAMILY MEDICINE

## 2019-07-15 PROCEDURE — 82043 UR ALBUMIN QUANTITATIVE: CPT | Performed by: FAMILY MEDICINE

## 2019-07-15 ASSESSMENT — MIFFLIN-ST. JEOR: SCORE: 2067.35

## 2019-07-15 ASSESSMENT — PAIN SCALES - GENERAL: PAINLEVEL: NO PAIN (0)

## 2019-07-17 LAB — HCYS SERPL-SCNC: 8 UMOL/L (ref 4–12)

## 2019-07-30 ENCOUNTER — OFFICE VISIT (OUTPATIENT)
Dept: CARDIOLOGY | Facility: CLINIC | Age: 64
End: 2019-07-30
Payer: COMMERCIAL

## 2019-07-30 ENCOUNTER — HOSPITAL ENCOUNTER (OUTPATIENT)
Dept: CARDIOLOGY | Facility: CLINIC | Age: 64
Discharge: HOME | End: 2019-07-30
Attending: INTERNAL MEDICINE | Admitting: INTERNAL MEDICINE
Payer: COMMERCIAL

## 2019-07-30 VITALS
HEART RATE: 56 BPM | DIASTOLIC BLOOD PRESSURE: 82 MMHG | WEIGHT: 270 LBS | BODY MASS INDEX: 36.62 KG/M2 | OXYGEN SATURATION: 96 % | SYSTOLIC BLOOD PRESSURE: 128 MMHG

## 2019-07-30 DIAGNOSIS — E78.5 HYPERLIPIDEMIA LDL GOAL <70: ICD-10-CM

## 2019-07-30 DIAGNOSIS — I21.4 NSTEMI (NON-ST ELEVATED MYOCARDIAL INFARCTION) (H): Primary | ICD-10-CM

## 2019-07-30 DIAGNOSIS — I25.10 CORONARY ARTERY DISEASE INVOLVING NATIVE CORONARY ARTERY OF NATIVE HEART, ANGINA PRESENCE UNSPECIFIED: ICD-10-CM

## 2019-07-30 DIAGNOSIS — I21.4 NSTEMI (NON-ST ELEVATED MYOCARDIAL INFARCTION) (H): ICD-10-CM

## 2019-07-30 PROCEDURE — 99214 OFFICE O/P EST MOD 30 MIN: CPT | Performed by: INTERNAL MEDICINE

## 2019-07-30 PROCEDURE — 93005 ELECTROCARDIOGRAM TRACING: CPT

## 2019-07-30 RX ORDER — CLOPIDOGREL BISULFATE 75 MG/1
75 TABLET ORAL DAILY
Qty: 90 TABLET | Refills: 3 | Status: SHIPPED | OUTPATIENT
Start: 2019-07-30 | End: 2020-08-04

## 2019-07-30 RX ORDER — ATORVASTATIN CALCIUM 40 MG/1
40 TABLET, FILM COATED ORAL DAILY
Qty: 90 TABLET | Refills: 3 | Status: SHIPPED | OUTPATIENT
Start: 2019-07-30 | End: 2020-08-04

## 2019-07-30 RX ORDER — METOPROLOL SUCCINATE 25 MG/1
TABLET, EXTENDED RELEASE ORAL
Qty: 45 TABLET | Refills: 3 | Status: SHIPPED | OUTPATIENT
Start: 2019-07-30 | End: 2020-08-04

## 2019-07-30 NOTE — LETTER
7/30/2019    Elsa Hutchinson MD  [address] [place] [address]    RE: Aries Hilario       Dear Colleague,    I had the pleasure of seeing Aries Hilario in the [facility].        Cardiology Clinic Note    Assessment & Plan     1.  Atherosclerotic coronary artery disease with a non-STEMI in 2016.  Status post PCI to mid circumflex lesion  2.  Angiogram 2017 with patency of previously placed stent and no additional coronary artery disease  3.  Hepatitis C chronic in remission  4.  Gastroesophageal reflux disease with history of GI bleed with gastric ulcers currently on monotherapy with Plavix  5.  Hyperlipidemia  6.  Anxiety  7.  Obesity     Recommendations    1.  Patient has not been seen for 2 years in our clinic.  During this timeframe he has gained approximately 25 pounds.  We have advised him to get closer to 250 pounds.  He states that he understands this and will try over the next several months.  He is on appropriate guideline directed medical therapy.  Let us continue with his current medications and my hope is that with weight loss his LDL will decrease from 99 to closer to 70 if possible.  2.  He will return to clinic in 1 years time, we will order an echocardiogram next year.  No active cardiac complaints at present.      Katherine Sotomayor MD      HPI:    Patient is a pleasant 64-year-old gentleman who has seen my colleagues Dr. Shepard, Dr. Callejas and Suyapa Nava.  I am meeting him for the first time.  He has a notable cardiac history of PCI being performed in 2016 for non-STEMI presentation and drug-eluting stent placed to his mid circumflex vessel.  He had a repeat angiogram in 2017 for unclear symptoms and concern for progressive coronary artery disease however this did not demonstrate any significant obstructive disease and his stent was patent.  He is on appropriate guideline directed medical therapy.  He has not had any interval cardiac symptoms.  He  unfortunately has gained 25 pounds which he feels is negatively impacting his health.  Is taking all his medications as prescribed and presents to reestablish care.        Primary Care Physician   Elsa Hutchinson      Patient Active Problem List   Diagnosis     Hepatitis C, chronic, in remission     Cannabis abuse, continuous     Thrombocytopenia due to drugs     Drug-induced leukopenia (H)     Anemia     Anxiety     GERD (gastroesophageal reflux disease)     Low back pain     NSTEMI (non-ST elevated myocardial infarction) (H)     Advanced directives, counseling/discussion     Hyperlipidemia LDL goal <70     Foreign body aspiration     Gastrointestinal hemorrhage with melena     Mild TBI (traumatic brain injury), with loss of consciousness of 30 minutes or less, subsequent encounter     Dizziness     Peripheral polyneuropathy     Abnormal weight gain     Urinary retention     Duodenal ulcer     H. pylori infection     Duodenal ulcer due to Helicobacter pylori     Coronary artery disease involving native coronary artery of native heart, angina presence unspecified     Obesity (BMI 35.0-39.9) with comorbidity (H)       Past Medical History   I have reviewed this patient's medical history and updated it with pertinent information if needed.   Past Medical History:   Diagnosis Date     Depression with anxiety      Hepatitis C      Peripheral neuropathy        Past Surgical History   I have reviewed this patient's surgical history and updated it with pertinent information if needed.  Past Surgical History:   Procedure Laterality Date     COLONOSCOPY N/A 9/29/2014    Procedure: COLONOSCOPY;  Surgeon: Néstor Steele MD;  Location: [facility]     HAND SURGERY       LAPAROSCOPIC HERNIORRHAPHY INGUINAL BILATERAL Bilateral 12/1/2014    Procedure: LAPAROSCOPIC HERNIORRHAPHY INGUINAL BILATERAL;  Surgeon: Navid Almendarez MD;  Location: [facility]       Prior to Admission Medications   Cannot display prior to admission medications  because the patient has not been admitted in this contact.     [unfilled]  [unfilled]  Allergies   Allergies   Allergen Reactions     Penicillins Hives       Social History    reports that he quit smoking about 14 years ago. He has never used smokeless tobacco. He reports that he does not drink alcohol or use drugs.    Family History   Family History   Problem Relation Age of Onset     Depression Father         suicide     Coronary Artery Disease Brother         MI in his 60s     Asthma No family hx of      Diabetes No family hx of      Hypertension No family hx of      Cerebrovascular Disease No family hx of      Cancer - colorectal No family hx of      Prostate Cancer No family hx of        Review of Systems   The comprehensive 10 point Review of Systems is negative other than noted in the HPI or here.     Physical Exam   Vital Signs with Ranges  Pulse:  [56] 56  BP: (128)/(82) 128/82  SpO2:  [96 %] 96 %  Wt Readings from Last 4 Encounters:   07/30/19 122.5 kg (270 lb)   07/15/19 123.4 kg (272 lb 2 oz)   09/12/18 122.6 kg (270 lb 6 oz)   06/29/18 122 kg (269 lb)     [unfilled]      Vitals: /82 (BP Location: Right arm, Patient Position: Sitting, Cuff Size: Adult Regular)   Pulse 56   Wt 122.5 kg (270 lb)   SpO2 96%   BMI 36.62 kg/m       NECK:  No jugular venous distention or carotid bruits.   CHEST:  Clear to auscultation.   CARDIOVASCULAR:  Revealed a regular rate and rhythm, no murmurs appreciated.   ABDOMEN:  Soft, nontender.   EXTREMITIES:  Demonstrate no edema.     @LABRCNTIPR(tropi:5,troponinies:5)@    @LABRCNTIPR(wbc:3,hgb:3,mcv:3,plt:3,inr:3,na:3,potassium:3,chloride:3,co2:3,bun:3,cr:3,gfrestimated:3,gfrestblack:3,aniongap:3,pratima:3,glc:3,albumin:2,prottotal:2,bilitotal:2,alkphos:2,alt:2,ast:2,lipase:2,tropi:3)@  Recent Labs   Lab Test 07/15/19  1053 09/12/18  1055  11/21/14  1104  02/15/12  0908   CHOL 167 147   < > 267*  --  235*   HDL 43 36*   < > 49  --  38*   LDL 99 90   < > 199*   <  > 182*   TRIG 126 103   < > 93  --  77   CHOLHDLRATIO  --   --   --  5.4*  --  6.0*    < > = values in this interval not displayed.     @LABRCNTIP(wbc:3,hgb:3,hct:3,mcv:3,plt:3,iron:3,ironsat:3,reticabsct:3,retp:3,feb:3,asha:3,b12:3,folic:3,epoe:3,morph:3)@  @LABRCNTIP(PH:3,PHV:3,PO2:3,PO2V:3,sat:3,PCO2:3,PCO2V:3,HCO3:3,HCO3V:3)@  @LABRCNTIP(NTBNPI:3,NTBNP:3)@  @LABRCNTIP(DD:1)@  @LABRCNTIP(sed:3,crp:3)@  @LABRCNTIP(PLT:3)@  @LABRCNTIP(TSH:3)@  @LABRCNTIP(color:1,appearance:1,urineg,urinebili:1,urineketone:1,s,ubld:1,urineph:1,protein:1,urobilinogen:1,nitrite:1,leukest:1,rbcu:1,wbcu:1)@    Imaging:  No results found for this or any previous visit (from the past 48 hour(s)).    Echo:  No results found for this or any previous visit (from the past 4320 hour(s)).           Thank you for allowing me to participate in the care of your patient.    Sincerely,     Katherine Sotomayor MD     [facility]

## 2019-07-30 NOTE — PROGRESS NOTES
Cardiology Clinic Note    Assessment & Plan     1.  Atherosclerotic coronary artery disease with a non-STEMI in 2016.  Status post PCI to mid circumflex lesion  2.  Angiogram 2017 with patency of previously placed stent and no additional coronary artery disease  3.  Hepatitis C chronic in remission  4.  Gastroesophageal reflux disease with history of GI bleed with gastric ulcers currently on monotherapy with Plavix  5.  Hyperlipidemia  6.  Anxiety  7.  Obesity     Recommendations    1.  Patient has not been seen for 2 years in our clinic.  During this timeframe he has gained approximately 25 pounds.  We have advised him to get closer to 250 pounds.  He states that he understands this and will try over the next several months.  He is on appropriate guideline directed medical therapy.  Let us continue with his current medications and my hope is that with weight loss his LDL will decrease from 99 to closer to 70 if possible.  2.  He will return to clinic in 1 years time, we will order an echocardiogram next year.  No active cardiac complaints at present.      Katherine Sotomayor MD      HPI:    Patient is a pleasant 64-year-old gentleman who has seen my colleagues Dr. Shepard, Dr. Callejas and Suyapa Nava.  I am meeting him for the first time.  He has a notable cardiac history of PCI being performed in 2016 for non-STEMI presentation and drug-eluting stent placed to his mid circumflex vessel.  He had a repeat angiogram in 2017 for unclear symptoms and concern for progressive coronary artery disease however this did not demonstrate any significant obstructive disease and his stent was patent.  He is on appropriate guideline directed medical therapy.  He has not had any interval cardiac symptoms.  He unfortunately has gained 25 pounds which he feels is negatively impacting his health.  Is taking all his medications as prescribed and presents to reestablish care.        Primary Care Physician   Elsa Arriaga  [facility]      Patient Active Problem List   Diagnosis     Hepatitis C, chronic, in remission     Cannabis abuse, continuous     Thrombocytopenia due to drugs     Drug-induced leukopenia (H)     Anemia     Anxiety     GERD (gastroesophageal reflux disease)     Low back pain     NSTEMI (non-ST elevated myocardial infarction) (H)     Advanced directives, counseling/discussion     Hyperlipidemia LDL goal <70     Foreign body aspiration     Gastrointestinal hemorrhage with melena     Mild TBI (traumatic brain injury), with loss of consciousness of 30 minutes or less, subsequent encounter     Dizziness     Peripheral polyneuropathy     Abnormal weight gain     Urinary retention     Duodenal ulcer     H. pylori infection     Duodenal ulcer due to Helicobacter pylori     Coronary artery disease involving native coronary artery of native heart, angina presence unspecified     Obesity (BMI 35.0-39.9) with comorbidity (H)       Past Medical History   I have reviewed this patient's medical history and updated it with pertinent information if needed.   Past Medical History:   Diagnosis Date     Depression with anxiety      Hepatitis C      Peripheral neuropathy        Past Surgical History   I have reviewed this patient's surgical history and updated it with pertinent information if needed.  Past Surgical History:   Procedure Laterality Date     COLONOSCOPY N/A 9/29/2014    Procedure: COLONOSCOPY;  Surgeon: Néstor Steele MD;  Location: [facility]     HAND SURGERY       LAPAROSCOPIC HERNIORRHAPHY INGUINAL BILATERAL Bilateral 12/1/2014    Procedure: LAPAROSCOPIC HERNIORRHAPHY INGUINAL BILATERAL;  Surgeon: Navid Almendarez MD;  Location: [facility]       Prior to Admission Medications   Cannot display prior to admission medications because the patient has not been admitted in this contact.     [unfilled]  [unfilled]  Allergies   Allergies   Allergen Reactions     Penicillins Hives       Social History    reports that he quit smoking  about 14 years ago. He has never used smokeless tobacco. He reports that he does not drink alcohol or use drugs.    Family History   Family History   Problem Relation Age of Onset     Depression Father         suicide     Coronary Artery Disease Brother         MI in his 60s     Asthma No family hx of      Diabetes No family hx of      Hypertension No family hx of      Cerebrovascular Disease No family hx of      Cancer - colorectal No family hx of      Prostate Cancer No family hx of        Review of Systems   The comprehensive 10 point Review of Systems is negative other than noted in the HPI or here.     Physical Exam   Vital Signs with Ranges  Pulse:  [56] 56  BP: (128)/(82) 128/82  SpO2:  [96 %] 96 %  Wt Readings from Last 4 Encounters:   07/30/19 122.5 kg (270 lb)   07/15/19 123.4 kg (272 lb 2 oz)   09/12/18 122.6 kg (270 lb 6 oz)   06/29/18 122 kg (269 lb)     [unfilled]      Vitals: /82 (BP Location: Right arm, Patient Position: Sitting, Cuff Size: Adult Regular)   Pulse 56   Wt 122.5 kg (270 lb)   SpO2 96%   BMI 36.62 kg/m      NECK:  No jugular venous distention or carotid bruits.   CHEST:  Clear to auscultation.   CARDIOVASCULAR:  Revealed a regular rate and rhythm, no murmurs appreciated.   ABDOMEN:  Soft, nontender.   EXTREMITIES:  Demonstrate no edema.     @LABRCNTIPR(tropi:5,troponinies:5)@    @LABRCNTIPR(wbc:3,hgb:3,mcv:3,plt:3,inr:3,na:3,potassium:3,chloride:3,co2:3,bun:3,cr:3,gfrestimated:3,gfrestblack:3,aniongap:3,pratima:3,glc:3,albumin:2,prottotal:2,bilitotal:2,alkphos:2,alt:2,ast:2,lipase:2,tropi:3)@  Recent Labs   Lab Test 07/15/19  1053 09/12/18  1055  11/21/14  1104  02/15/12  0908   CHOL 167 147   < > 267*  --  235*   HDL 43 36*   < > 49  --  38*   LDL 99 90   < > 199*   < > 182*   TRIG 126 103   < > 93  --  77   CHOLHDLRATIO  --   --   --  5.4*  --  6.0*    < > = values in this interval not displayed.      @LABRCNTIP(wbc:3,hgb:3,hct:3,mcv:3,plt:3,iron:3,ironsat:3,reticabsct:3,retp:3,feb:3,asha:3,b12:3,folic:3,epoe:3,morph:3)@  @LABRCNTIP(PH:3,PHV:3,PO2:3,PO2V:3,sat:3,PCO2:3,PCO2V:3,HCO3:3,HCO3V:3)@  @LABRCNTIP(NTBNPI:3,NTBNP:3)@  @LABRCNTIP(DD:1)@  @LABRCNTIP(sed:3,crp:3)@  @LABRCNTIP(PLT:3)@  @LABRCNTIP(TSH:3)@  @LABRCNTIP(color:1,appearance:1,urineg,urinebili:1,urineketone:1,s,ubld:1,urineph:1,protein:1,urobilinogen:1,nitrite:1,leukest:1,rbcu:1,wbcu:1)@    Imaging:  No results found for this or any previous visit (from the past 48 hour(s)).    Echo:  No results found for this or any previous visit (from the past 4320 hour(s)).

## 2019-11-05 ENCOUNTER — HEALTH MAINTENANCE LETTER (OUTPATIENT)
Age: 64
End: 2019-11-05

## 2020-05-27 ENCOUNTER — TELEPHONE (OUTPATIENT)
Dept: CARDIOLOGY | Facility: CLINIC | Age: 65
End: 2020-05-27

## 2020-05-27 NOTE — TELEPHONE ENCOUNTER
----- Message from Mary Mckenna sent at 5/27/2020 10:06 AM CDT -----  Regarding: wants IN CLINIC  Patient advocate mira arenas is calling in - regarding increased symptoms of heart pain and tightness.   She wants him seen, wondering - aside from ER - when a Dr is in clinic to see anyone. She is stating in her vm that pt continually refuses ER -     Please call her -   [phone]    Tin's number is [phone]

## 2020-05-27 NOTE — TELEPHONE ENCOUNTER
Attempted to reach pt - LM for pt to call back.  Need to get permission to speak to Kasia.  YEISON NO RN on 5/27/2020 at 10:44 AM    Attempt #2 to reach pt - no answer.  YEISON NO RN on 5/27/2020 at 11:09 AM    ADDENDUM:  Attempted to call pt again. LMTCB if wanting to be seen.  YEISON NO RN on 5/28/2020 at 10:00 AM    ADDENDUM: Prs wife Kasia calls in with Ray in the background. Disc we could do a video visit with Dr Sotomayor, see how he is doing and Dr Sotomayor could order any testing deemed necessary. Pt wants to see Dr Sotomayor in person and refuses ED. Disc he could go call [facility] to see if he could get in to see the doctor staffing the clinic that day. He elected to go that route. Scheduling number given. Chasity Sanchez RN Cardiology June 2, 2020, 3:16 PM

## 2020-06-02 ENCOUNTER — NURSE TRIAGE (OUTPATIENT)
Dept: CARDIOLOGY | Facility: CLINIC | Age: 65
End: 2020-06-02

## 2020-06-02 NOTE — TELEPHONE ENCOUNTER
I received a call from Kasia, patient's significant other. Patient won't call himself. According to her patient has been having chest pain, especially with exertion for quite some time. He adamantly refuses to go to the ER. He just wants Dr. Sotomayor to order some tests. I told her if he wants tests done he needs to go to an ER because we are seeing very few patients in clinic. We also are not able to conduct testing in the clinic that he would need. She asked if I could recommend an ER with good heart care. They live in [place] so I told her the closest ones are [place] or [place]. She asked about [facility] and I told her they would be fine but are a different health care system. She was grateful for our talk. She will see if she can convince him to go in and be seen.

## 2020-06-19 ENCOUNTER — TELEPHONE (OUTPATIENT)
Dept: CARDIOLOGY | Facility: CLINIC | Age: 65
End: 2020-06-19

## 2020-06-19 NOTE — TELEPHONE ENCOUNTER
"Pt called upset by \"lack of care\" and inability to be seen.  Pt states he is having problems but will not elaborate.    Several tele encounters c/o CP but declining to go to ED.    Agreed to make phone visit - is due for annual in July.    YEISON NO RN on 6/19/2020 at 8:34 AM     "

## 2020-06-22 ENCOUNTER — VIRTUAL VISIT (OUTPATIENT)
Dept: CARDIOLOGY | Facility: CLINIC | Age: 65
End: 2020-06-22
Payer: COMMERCIAL

## 2020-06-22 ENCOUNTER — TELEPHONE (OUTPATIENT)
Dept: CARDIOLOGY | Facility: CLINIC | Age: 65
End: 2020-06-22

## 2020-06-22 DIAGNOSIS — E78.5 HYPERLIPIDEMIA LDL GOAL <70: ICD-10-CM

## 2020-06-22 DIAGNOSIS — I25.118 CORONARY ARTERY DISEASE OF NATIVE ARTERY OF NATIVE HEART WITH STABLE ANGINA PECTORIS (H): Primary | ICD-10-CM

## 2020-06-22 NOTE — TELEPHONE ENCOUNTER
I was running late for patient's appointment today.  Unfortunately when I tried calling him he was unavailable.  I left him a message to call back.  He returned the call to my nurse about 10 minutes later.  I therefore was able to call the patient back for our visit.  He started the conversation by telling me he had a short time to do the visit and would rather do that then reschedule.    He tells me that he has been experiencing chest pain which was worse for approximately 1 week and has now slowly started to improve.  His last episode of chest pain was 2 nights ago overnight.  He cannot tell me if this is what awoke him from sleep or if he developed chest pain after he was already up going to the bathroom.  He otherwise during the conversation stated that his chest pain was sharp and only occurring with exertion or if he was overheating out in the sun.  He also has complaints of significant fatigue throughout the day. 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  This has started to slowly improve.  Before reaching him, I spoke with his wife Kasia who also mentioned that the patient has had problems with low libido.    When I was asking him questions about his chest pain, he got upset and asked could not I just order him some testing.  I explained that I needed information about his chest pain and symptoms so that we could collect all of information and then I would recommend what testing to proceed with.  He had explained that he needed to get off the phone as he had to leave for prior engagements and was unable to finish our conversation.  I again apologized for running late today and explained that we could reschedule the conversation by having scheduling call him or he could call scheduling tomorrow when he had time.  He explained that he just wanted me to call him sometime when I was ready to talk or text him when I was available.  I again explained that he needs  to be rescheduled and put on my calendar for the day.  He again got upset and hung up the phone on me.    Given that we did not complete his visit today, I will not charge him for today's visit and hopefully this can be rescheduled.  I did also explain to him, that anytime he is having significant chest pain or symptoms, that he should present to the ED to ensure he is not having an acute MI.    Suyapa Beckwith, [facility] CNP

## 2020-06-22 NOTE — LETTER
6/22/2020    Elsa Hutchinson MD  [address] [place] [address]    RE: Aries Hilario       Dear Colleague,    I had the pleasure of seeing Aries Hilario in the [facility].    Rescheduled see Tele encounter.     Thank you for allowing me to participate in the care of your patient.      Sincerely,     MER[facility] Dooley CNP     [facility] [place] [facility]    cc:   No referring provider defined for this encounter.

## 2020-06-22 NOTE — PATIENT INSTRUCTIONS
Medication Changes:  START imdur 30 mg daily     Recommendations:  1. Go to the ER if symptoms worsen or are not relieved with nitroglycerin    Follow-up:  See Dr. Sotomayor or Suyapa WRIGHT for cardiology follow up at [place]: 1 month with stress test and echo prior. Call to schedule.     Cardiology Scheduling~[phone]  Cardiology Clinic RNs~[phone] (Carolann Ferrera RN and Chasity Sanchez RN)

## 2020-07-06 ENCOUNTER — TELEPHONE (OUTPATIENT)
Dept: CARDIOLOGY | Facility: CLINIC | Age: 65
End: 2020-07-06

## 2020-07-06 ENCOUNTER — HOSPITAL ENCOUNTER (OUTPATIENT)
Dept: CARDIOLOGY | Facility: CLINIC | Age: 65
Discharge: HOME | End: 2020-07-06
Attending: INTERNAL MEDICINE | Admitting: INTERNAL MEDICINE
Payer: COMMERCIAL

## 2020-07-06 DIAGNOSIS — I21.4 NSTEMI (NON-ST ELEVATED MYOCARDIAL INFARCTION) (H): ICD-10-CM

## 2020-07-06 DIAGNOSIS — E78.5 HYPERLIPIDEMIA LDL GOAL <70: ICD-10-CM

## 2020-07-06 LAB
ALT SERPL W P-5'-P-CCNC: 20 U/L (ref 0–70)
CHOLEST SERPL-MCNC: 158 MG/DL
HDLC SERPL-MCNC: 40 MG/DL
LDLC SERPL CALC-MCNC: 98 MG/DL
NONHDLC SERPL-MCNC: 118 MG/DL
TRIGL SERPL-MCNC: 102 MG/DL

## 2020-07-06 PROCEDURE — 36415 COLL VENOUS BLD VENIPUNCTURE: CPT | Performed by: INTERNAL MEDICINE

## 2020-07-06 PROCEDURE — 80061 LIPID PANEL: CPT | Performed by: INTERNAL MEDICINE

## 2020-07-06 PROCEDURE — 93306 TTE W/DOPPLER COMPLETE: CPT

## 2020-07-06 PROCEDURE — 84460 ALANINE AMINO (ALT) (SGPT): CPT | Performed by: INTERNAL MEDICINE

## 2020-07-06 PROCEDURE — 93306 TTE W/DOPPLER COMPLETE: CPT | Mod: 26 | Performed by: INTERNAL MEDICINE

## 2020-07-06 NOTE — TELEPHONE ENCOUNTER
Pt's SO called.  She and pt very frustrated that pt has not has stress testing done.  Pt continues to have CP and arm pains.     Explained that provider needs to do thorough assessment in able to order correct test.  Pt has not been seen for a year.    Per note 6/22/20 - provider called late unable to have visit. Pt did not want to r/s and hung up on provider.    Pt does not want to r/s with that provider.  Caller states they do not want to wait until 7/28/20 to see Dr Sotomayor in clinic.    Due to schedule - there is no other provider appts until the one he already has with Dr Sotomayor. Offered that they could call [facility] to set up an In Clinic visit.They want to have appt at [place] and do not want to go to [facility].    They agreed to have video visit with another provider sooner.  She will be able to be with pt during video call.    Scheduled with Dr Patterson 7/15/20    YEISON NO, RN on 7/6/2020 at 4:17 PM

## 2020-07-15 ENCOUNTER — VIRTUAL VISIT (OUTPATIENT)
Dept: CARDIOLOGY | Facility: CLINIC | Age: 65
End: 2020-07-15
Payer: COMMERCIAL

## 2020-07-15 DIAGNOSIS — I20.0 UNSTABLE ANGINA (H): ICD-10-CM

## 2020-07-15 DIAGNOSIS — Z01.818 PREOPERATIVE EXAMINATION: ICD-10-CM

## 2020-07-15 DIAGNOSIS — Z11.59 ENCOUNTER FOR SCREENING FOR OTHER VIRAL DISEASES: Primary | ICD-10-CM

## 2020-07-15 DIAGNOSIS — I25.118 CORONARY ARTERY DISEASE OF NATIVE ARTERY OF NATIVE HEART WITH STABLE ANGINA PECTORIS (H): Primary | ICD-10-CM

## 2020-07-15 PROCEDURE — 99203 OFFICE O/P NEW LOW 30 MIN: CPT | Mod: 95 | Performed by: INTERNAL MEDICINE

## 2020-07-15 NOTE — LETTER
7/15/2020    Elsa Hutchinson MD  [address] [place] [address]    RE: Aries Hilario       Dear Colleague,    I had the pleasure of seeing Aries Hilario in the [facility].      Aries Hilario is a 65 year old male who is being evaluated via a billable video visit.        PROVIDER NOTE:     This is a 65 year old male with PMH NSTEMI in 2016 s/p PCI to the mid LCX, Hep C, GERD with history of gastric ulcers in the past on aspirin, who presents with crescendo pattern of chest pain. Has been going on for a few months, exertional and now at rest and radiates to left arm, feels the same has when he had MI in 2016. On plavix monotherapy. Has treated gastric ulcers. Occasionally gets blood streaking in his stools he attributes to hemorrhoids. For therapy he's on metoprolol, plavix, statin. On ROS he is having leg numbness. No sob. No LE edema.     ASSESSMENT/PLAN:    1. Coronary angiogram    2. Would like urgent GI virtual consult - I believe his symptoms are not related to gastric ulcers (he denies melena, says this was how they presented in past) but would like an opinion. Regardless he will need angiogram given symptoms. Patient amenable to proceed with angiogram and will watch for melena should be on dual antiplatelet therapy.     3. Continue current medication. Rx'd imdur last visit which he is not taking. Aspirin load at time of cath. BP controlled.    Follow up in 1 month.     Total time spent 22 minutes.    Nano Patterson MD MSc  [facility]      CURRENT MEDICATIONS:  Current Outpatient Medications   Medication Sig Dispense Refill     ASPIRIN NOT PRESCRIBED (INTENTIONAL) Patient not taking due to PMHx ulcer       atorvastatin (LIPITOR) 40 MG tablet Take 1 tablet (40 mg) by mouth daily 90 tablet 3     clopidogrel (PLAVIX) 75 MG tablet Take 1 tablet (75 mg) by mouth daily 90 tablet 3     fluticasone (FLONASE) 50 MCG/ACT spray Spray 1-2 sprays into both nostrils daily  48 g 3     metoprolol succinate ER (TOPROL-XL) 25 MG 24 hr tablet TAKE ONE-HALF TABLET BY MOUTH DAILY 45 tablet 3     nitroGLYcerin (NITROSTAT) 0.4 MG sublingual tablet For chest pain place 1 tablet under the tongue every 5 minutes for 3 doses. If symptoms persist 5 minutes after 1st dose call 911. 25 tablet 3       ALLERGIES     Allergies   Allergen Reactions     Penicillins Hives       PAST MEDICAL HISTORY:  Past Medical History:   Diagnosis Date     Depression with anxiety      Hepatitis C      Peripheral neuropathy        PAST SURGICAL HISTORY:  Past Surgical History:   Procedure Laterality Date     COLONOSCOPY N/A 9/29/2014    Procedure: COLONOSCOPY;  Surgeon: Néstor Steele MD;  Location: [facility]     HAND SURGERY       LAPAROSCOPIC HERNIORRHAPHY INGUINAL BILATERAL Bilateral 12/1/2014    Procedure: LAPAROSCOPIC HERNIORRHAPHY INGUINAL BILATERAL;  Surgeon: Navid Almendarez MD;  Location: [facility]       FAMILY HISTORY:  Family History   Problem Relation Age of Onset     Depression Father         suicide     Coronary Artery Disease Brother         MI in his 60s     Asthma No family hx of      Diabetes No family hx of      Hypertension No family hx of      Cerebrovascular Disease No family hx of      Cancer - colorectal No family hx of      Prostate Cancer No family hx of        SOCIAL HISTORY:  Social History     Socioeconomic History     Marital status:      Spouse name: None     Number of children: None     Years of education: None     Highest education level: None   Occupational History     None   Social Needs     Financial resource strain: None     Food insecurity     Worry: None     Inability: None     Transportation needs     Medical: None     Non-medical: None   Tobacco Use     Smoking status: Former Smoker     Last attempt to quit: 1/1/2005     Years since quitting: 15.5     Smokeless tobacco: Never Used   Substance and Sexual Activity     Alcohol use: No     Drug use: No     Sexual activity: Yes      Partners: Female   Lifestyle     Physical activity     Days per week: None     Minutes per session: None     Stress: None   Relationships     Social connections     Talks on phone: None     Gets together: None     Attends [employer] service: None     Active member of club or organization: None     Attends meetings of clubs or organizations: None     Relationship status: None     Intimate partner violence     Fear of current or ex partner: None     Emotionally abused: None     Physically abused: None     Forced sexual activity: None   Other Topics Concern     Parent/sibling w/ CABG, MI or angioplasty before 65F 55M? No   Social History Narrative     None       Review of Systems:  Skin:  Negative bruising   Eyes:  Positive for glasses  ENT:  Positive for sinus trouble  Respiratory:  Negative    Cardiovascular:    Positive for;fatigue;heaviness  Gastroenterology: Negative excessive gas or bloating;diarrhea  Genitourinary:  Positive for urinary frequency;urgency;prostate problem  Musculoskeletal:  Negative nocturnal cramping;muscular weakness;joint pain;joint swelling  Neurologic:  Positive for numbness or tingling of hands;numbness or tingling of feet;memory problems;incoordination  Psychiatric:  Positive for anxiety  Heme/Lymph/Imm:  Positive for night sweats  Endocrine:  Negative      Physical Exam:  Vitals: There were no vitals taken for this visit.  GENERAL: healthy, alert and no distress  EYES: Eyes grossly normal to inspection, conjunctivae and sclerae normal  RESP: no audible wheeze, cough, or visible cyanosis.  No visible retractions or increased work of breathing.  Able to speak fully in complete sentences  NEURO: Cranial nerves grossly intact, mentation intact and speech normal  PSYCH: mentation appears normal, affect normal/bright, judgement and insight intact, normal speech and appearance well-groomed  CARDIOVASCULAR: Neck veins not appreciated indicating probable normal JVP. No LE edema. Normal skin  appearance, no stasis dermatitis.     Recent Lab Results:  LIPID RESULTS:  Lab Results   Component Value Date    CHOL 158 07/06/2020    HDL 40 07/06/2020    LDL 98 07/06/2020    TRIG 102 07/06/2020    CHOLHDLRATIO 5.4 (H) 11/21/2014       LIVER ENZYME RESULTS:  Lab Results   Component Value Date    AST 19 10/17/2016    ALT 20 07/06/2020       CBC RESULTS:  Lab Results   Component Value Date    WBC 8.5 07/28/2017    RBC 5.26 07/28/2017    HGB 16.4 07/28/2017    HCT 47.3 07/28/2017    MCV 90 07/28/2017    MCH 31.2 07/28/2017    MCHC 34.7 07/28/2017    RDW 12.8 07/28/2017     07/28/2017       BMP RESULTS:  Lab Results   Component Value Date     09/12/2018    POTASSIUM 4.6 09/12/2018    CHLORIDE 103 09/12/2018    CO2 32 09/12/2018    ANIONGAP 2 (L) 09/12/2018     (H) 09/12/2018    BUN 23 09/12/2018    CR 1.02 09/12/2018    GFRESTIMATED 74 09/12/2018    GFRESTBLACK 89 09/12/2018    TYREE 8.6 09/12/2018        A1C RESULTS:  Lab Results   Component Value Date    A1C 6.5 (H) 07/15/2019       INR RESULTS:  Lab Results   Component Value Date    INR 0.93 07/28/2017    INR 1.11 10/13/2016       Thank you for allowing me to participate in the care of your patient.    Sincerely,     Nano Patterson MD     [facility]

## 2020-07-15 NOTE — PROGRESS NOTES
"        Aries Hilario is a 65 year old male who is being evaluated via a billable video visit.      The patient has been notified of following:     \"This video visit will be conducted via a call between you and your physician/provider. We have found that certain health care needs can be provided without the need for an in-person physical exam.  This service lets us provide the care you need with a video conversation.  If a prescription is necessary we can send it directly to your pharmacy.  If lab work is needed we can place an order for that and you can then stop by our lab to have the test done at a later time.    Video visits are billed at different rates depending on your insurance coverage.  Please reach out to your insurance provider with any questions.    If during the course of the call the physician/provider feels a video visit is not appropriate, you will not be charged for this service.\"    Patient has given verbal consent for Video visit? Yes  How would you like to obtain your AVS? Christian  Patient would like the video invitation sent by: Send to e-mail at: [email or web address]  Will anyone else be joining your video visit? No        PROVIDER NOTE:     This is a 65 year old male with PMH NSTEMI in 2016 s/p PCI to the mid LCX, Hep C, GERD with history of gastric ulcers in the past on aspirin, who presents with crescendo pattern of chest pain. Has been going on for a few months, exertional and now at rest and radiates to left arm, feels the same has when he had MI in 2016. On plavix monotherapy. Has treated gastric ulcers. Occasionally gets blood streaking in his stools he attributes to hemorrhoids. For therapy he's on metoprolol, plavix, statin. On ROS he is having leg numbness. No sob. No LE edema.     ASSESSMENT/PLAN:    1. Coronary angiogram    2. Would like urgent GI virtual consult - I believe his symptoms are not related to gastric ulcers (he denies melena, says this was how they presented in " past) but would like an opinion. Regardless he will need angiogram given symptoms. Patient amenable to proceed with angiogram and will watch for melena should be on dual antiplatelet therapy.     3. Continue current medication. Rx'd imdur last visit which he is not taking. Aspirin load at time of cath. BP controlled.    Follow up in 1 month.     Total time spent 22 minutes.    Nano Patterson MD MSc  [facility]      CURRENT MEDICATIONS:  Current Outpatient Medications   Medication Sig Dispense Refill     ASPIRIN NOT PRESCRIBED (INTENTIONAL) Patient not taking due to PMHx ulcer       atorvastatin (LIPITOR) 40 MG tablet Take 1 tablet (40 mg) by mouth daily 90 tablet 3     clopidogrel (PLAVIX) 75 MG tablet Take 1 tablet (75 mg) by mouth daily 90 tablet 3     fluticasone (FLONASE) 50 MCG/ACT spray Spray 1-2 sprays into both nostrils daily 48 g 3     metoprolol succinate ER (TOPROL-XL) 25 MG 24 hr tablet TAKE ONE-HALF TABLET BY MOUTH DAILY 45 tablet 3     nitroGLYcerin (NITROSTAT) 0.4 MG sublingual tablet For chest pain place 1 tablet under the tongue every 5 minutes for 3 doses. If symptoms persist 5 minutes after 1st dose call 911. 25 tablet 3       ALLERGIES     Allergies   Allergen Reactions     Penicillins Hives       PAST MEDICAL HISTORY:  Past Medical History:   Diagnosis Date     Depression with anxiety      Hepatitis C      Peripheral neuropathy        PAST SURGICAL HISTORY:  Past Surgical History:   Procedure Laterality Date     COLONOSCOPY N/A 9/29/2014    Procedure: COLONOSCOPY;  Surgeon: Néstor Steele MD;  Location: [facility]     HAND SURGERY       LAPAROSCOPIC HERNIORRHAPHY INGUINAL BILATERAL Bilateral 12/1/2014    Procedure: LAPAROSCOPIC HERNIORRHAPHY INGUINAL BILATERAL;  Surgeon: Navid Almendarez MD;  Location: [facility]       FAMILY HISTORY:  Family History   Problem Relation Age of Onset     Depression Father         suicide     Coronary Artery Disease Brother         MI in his 60s     Asthma No  family hx of      Diabetes No family hx of      Hypertension No family hx of      Cerebrovascular Disease No family hx of      Cancer - colorectal No family hx of      Prostate Cancer No family hx of        SOCIAL HISTORY:  Social History     Socioeconomic History     Marital status:      Spouse name: None     Number of children: None     Years of education: None     Highest education level: None   Occupational History     None   Social Needs     Financial resource strain: None     Food insecurity     Worry: None     Inability: None     Transportation needs     Medical: None     Non-medical: None   Tobacco Use     Smoking status: Former Smoker     Last attempt to quit: 1/1/2005     Years since quitting: 15.5     Smokeless tobacco: Never Used   Substance and Sexual Activity     Alcohol use: No     Drug use: No     Sexual activity: Yes     Partners: Female   Lifestyle     Physical activity     Days per week: None     Minutes per session: None     Stress: None   Relationships     Social connections     Talks on phone: None     Gets together: None     Attends [employer] service: None     Active member of club or organization: None     Attends meetings of clubs or organizations: None     Relationship status: None     Intimate partner violence     Fear of current or ex partner: None     Emotionally abused: None     Physically abused: None     Forced sexual activity: None   Other Topics Concern     Parent/sibling w/ CABG, MI or angioplasty before 65F 55M? No   Social History Narrative     None       Review of Systems:  Skin:  Negative bruising   Eyes:  Positive for glasses  ENT:  Positive for sinus trouble  Respiratory:  Negative    Cardiovascular:    Positive for;fatigue;heaviness  Gastroenterology: Negative excessive gas or bloating;diarrhea  Genitourinary:  Positive for urinary frequency;urgency;prostate problem  Musculoskeletal:  Negative nocturnal cramping;muscular weakness;joint pain;joint swelling  Neurologic:   Positive for numbness or tingling of hands;numbness or tingling of feet;memory problems;incoordination  Psychiatric:  Positive for anxiety  Heme/Lymph/Imm:  Positive for night sweats  Endocrine:  Negative      Physical Exam:  Vitals: There were no vitals taken for this visit.  GENERAL: healthy, alert and no distress  EYES: Eyes grossly normal to inspection, conjunctivae and sclerae normal  RESP: no audible wheeze, cough, or visible cyanosis.  No visible retractions or increased work of breathing.  Able to speak fully in complete sentences  NEURO: Cranial nerves grossly intact, mentation intact and speech normal  PSYCH: mentation appears normal, affect normal/bright, judgement and insight intact, normal speech and appearance well-groomed  CARDIOVASCULAR: Neck veins not appreciated indicating probable normal JVP. No LE edema. Normal skin appearance, no stasis dermatitis.     Recent Lab Results:  LIPID RESULTS:  Lab Results   Component Value Date    CHOL 158 07/06/2020    HDL 40 07/06/2020    LDL 98 07/06/2020    TRIG 102 07/06/2020    CHOLHDLRATIO 5.4 (H) 11/21/2014       LIVER ENZYME RESULTS:  Lab Results   Component Value Date    AST 19 10/17/2016    ALT 20 07/06/2020       CBC RESULTS:  Lab Results   Component Value Date    WBC 8.5 07/28/2017    RBC 5.26 07/28/2017    HGB 16.4 07/28/2017    HCT 47.3 07/28/2017    MCV 90 07/28/2017    MCH 31.2 07/28/2017    MCHC 34.7 07/28/2017    RDW 12.8 07/28/2017     07/28/2017       BMP RESULTS:  Lab Results   Component Value Date     09/12/2018    POTASSIUM 4.6 09/12/2018    CHLORIDE 103 09/12/2018    CO2 32 09/12/2018    ANIONGAP 2 (L) 09/12/2018     (H) 09/12/2018    BUN 23 09/12/2018    CR 1.02 09/12/2018    GFRESTIMATED 74 09/12/2018    GFRESTBLACK 89 09/12/2018    TYREE 8.6 09/12/2018        A1C RESULTS:  Lab Results   Component Value Date    A1C 6.5 (H) 07/15/2019       INR RESULTS:  Lab Results   Component Value Date    INR 0.93 07/28/2017    INR 1.11  10/13/2016           CC  No referring provider defined for this encounter.      Video-Visit Details    Type of service:  Video Visit    Video Time: 22 minutes    Originating Location (pt. Location): home    Distant Location (provider location):  [facility]     Platform used for Video Visit: DOX

## 2020-07-15 NOTE — PATIENT INSTRUCTIONS
1. Coronary angiogram this week   2. Urgent gastroenterology consult regarding   3. Follow up with Dr. Patterson 1 month

## 2020-07-16 ENCOUNTER — TRANSFERRED RECORDS (OUTPATIENT)
Dept: HEALTH INFORMATION MANAGEMENT | Facility: CLINIC | Age: 65
End: 2020-07-16

## 2020-07-16 RX ORDER — SODIUM CHLORIDE 9 MG/ML
INJECTION, SOLUTION INTRAVENOUS CONTINUOUS
Status: CANCELLED | OUTPATIENT
Start: 2020-07-16

## 2020-07-16 RX ORDER — LIDOCAINE 40 MG/G
CREAM TOPICAL
Status: CANCELLED | OUTPATIENT
Start: 2020-07-16

## 2020-07-20 ENCOUNTER — TELEPHONE (OUTPATIENT)
Dept: LAB | Facility: CLINIC | Age: 65
End: 2020-07-20

## 2020-07-20 DIAGNOSIS — I20.0 UNSTABLE ANGINA (H): ICD-10-CM

## 2020-07-20 DIAGNOSIS — Z01.818 PREOPERATIVE EXAMINATION: ICD-10-CM

## 2020-07-20 DIAGNOSIS — I25.118 CORONARY ARTERY DISEASE OF NATIVE ARTERY OF NATIVE HEART WITH STABLE ANGINA PECTORIS (H): ICD-10-CM

## 2020-07-20 PROCEDURE — U0003 INFECTIOUS AGENT DETECTION BY NUCLEIC ACID (DNA OR RNA); SEVERE ACUTE RESPIRATORY SYNDROME CORONAVIRUS 2 (SARS-COV-2) (CORONAVIRUS DISEASE [COVID-19]), AMPLIFIED PROBE TECHNIQUE, MAKING USE OF HIGH THROUGHPUT TECHNOLOGIES AS DESCRIBED BY CMS-2020-01-R: HCPCS | Performed by: INTERNAL MEDICINE

## 2020-07-20 NOTE — TELEPHONE ENCOUNTER
Received msg that pt has not had COVID test prior to angio that is scheduled 7/22/20.    Pt states he was not called until late yesterday and was not able to get it.    Spent 60 minutes on phone to get pt overbooked for COVID at [place] today 7/20.    Pt verbalized understanding that if test not done today angio will have to be rescheduled.    PT states he did not get any info re: angio.  He does nit use MyChart.  Per pt - ok to call Kasia to access [employer] of review prep.    LM with Kasia.    YEISON NO, RN on 7/20/2020 at 10:47 AM

## 2020-07-21 ENCOUNTER — DOCUMENTATION ONLY (OUTPATIENT)
Dept: CARDIOLOGY | Facility: CLINIC | Age: 65
End: 2020-07-21

## 2020-07-21 LAB
SARS-COV-2 RNA SPEC QL NAA+PROBE: NOT DETECTED
SPECIMEN SOURCE: NORMAL

## 2020-07-21 NOTE — PROGRESS NOTES
Wellness Screening Tool    Symptom Screening:    Do you have one of the following NEW symptoms:      Fever (subjective or >100.0)?  No    New cough? Np    Shortness of breath? No    Chills? No    New loss of taste or smell? No    Generalized body aches? No    New persistent headache? No    New sore throat? No    Nausea, vomiting or diarrhea? No    Within the past 3 weeks, have you been exposed to someone with a known positive illness below?      COVID - 19 (known or suspected) No    Chicken pox?  No    Measles? No    Pertussis? No      Patient notified of visitor restriction: Yes  Patient informed to wear a mask: Yes    Patient's appointment status: Patient will be seen in clinic as scheduled on 7/22/20.       WENDY Bueno July 21, 2020 3:50 PM

## 2020-07-21 NOTE — PROGRESS NOTES
Called patient to go over wellness/travel screening. Left VM for call back.         WENDY Bueno July 21, 2020 2:26 PM

## 2020-07-22 ENCOUNTER — HOSPITAL ENCOUNTER (OUTPATIENT)
Facility: CLINIC | Age: 65
Discharge: HOME | End: 2020-07-22
Admitting: INTERNAL MEDICINE
Payer: COMMERCIAL

## 2020-07-22 ENCOUNTER — CARE COORDINATION (OUTPATIENT)
Dept: CARDIOLOGY | Facility: CLINIC | Age: 65
End: 2020-07-22

## 2020-07-22 ENCOUNTER — SURGERY (OUTPATIENT)
Age: 65
End: 2020-07-22
Payer: COMMERCIAL

## 2020-07-22 VITALS
TEMPERATURE: 97.5 F | SYSTOLIC BLOOD PRESSURE: 129 MMHG | RESPIRATION RATE: 16 BRPM | BODY MASS INDEX: 36.04 KG/M2 | OXYGEN SATURATION: 97 % | HEIGHT: 72 IN | WEIGHT: 266.1 LBS | DIASTOLIC BLOOD PRESSURE: 75 MMHG | HEART RATE: 52 BPM

## 2020-07-22 DIAGNOSIS — I25.118 CORONARY ARTERY DISEASE OF NATIVE ARTERY OF NATIVE HEART WITH STABLE ANGINA PECTORIS (H): ICD-10-CM

## 2020-07-22 DIAGNOSIS — I20.0 UNSTABLE ANGINA (H): ICD-10-CM

## 2020-07-22 PROBLEM — Z98.890 STATUS POST CORONARY ANGIOGRAM: Status: ACTIVE | Noted: 2020-07-22

## 2020-07-22 LAB
ANION GAP SERPL CALCULATED.3IONS-SCNC: 5 MMOL/L (ref 3–14)
APTT PPP: 31 SEC (ref 22–37)
BUN SERPL-MCNC: 23 MG/DL (ref 7–30)
CALCIUM SERPL-MCNC: 8.7 MG/DL (ref 8.5–10.1)
CHLORIDE SERPL-SCNC: 107 MMOL/L (ref 94–109)
CO2 SERPL-SCNC: 26 MMOL/L (ref 20–32)
CREAT SERPL-MCNC: 0.83 MG/DL (ref 0.66–1.25)
ERYTHROCYTE [DISTWIDTH] IN BLOOD BY AUTOMATED COUNT: 13.4 % (ref 10–15)
GFR SERPL CREATININE-BSD FRML MDRD: >90 ML/MIN/{1.73_M2}
GLUCOSE SERPL-MCNC: 120 MG/DL (ref 70–99)
HCT VFR BLD AUTO: 49 % (ref 40–53)
HGB BLD-MCNC: 16.4 G/DL (ref 13.3–17.7)
INR PPP: 1.02 (ref 0.86–1.14)
MCH RBC QN AUTO: 30.7 PG (ref 26.5–33)
MCHC RBC AUTO-ENTMCNC: 33.5 G/DL (ref 31.5–36.5)
MCV RBC AUTO: 92 FL (ref 78–100)
PLATELET # BLD AUTO: 214 10E9/L (ref 150–450)
POTASSIUM SERPL-SCNC: 4.1 MMOL/L (ref 3.4–5.3)
RBC # BLD AUTO: 5.34 10E12/L (ref 4.4–5.9)
SODIUM SERPL-SCNC: 138 MMOL/L (ref 133–144)
WBC # BLD AUTO: 8.4 10E9/L (ref 4–11)

## 2020-07-22 PROCEDURE — 25000125 ZZHC RX 250: Performed by: INTERNAL MEDICINE

## 2020-07-22 PROCEDURE — 80048 BASIC METABOLIC PNL TOTAL CA: CPT | Performed by: INTERNAL MEDICINE

## 2020-07-22 PROCEDURE — 93005 ELECTROCARDIOGRAM TRACING: CPT

## 2020-07-22 PROCEDURE — 93010 ELECTROCARDIOGRAM REPORT: CPT | Performed by: INTERNAL MEDICINE

## 2020-07-22 PROCEDURE — 93458 L HRT ARTERY/VENTRICLE ANGIO: CPT | Performed by: INTERNAL MEDICINE

## 2020-07-22 PROCEDURE — 36415 COLL VENOUS BLD VENIPUNCTURE: CPT

## 2020-07-22 PROCEDURE — 40000235 ZZH STATISTIC TELEMETRY

## 2020-07-22 PROCEDURE — 99152 MOD SED SAME PHYS/QHP 5/>YRS: CPT | Performed by: INTERNAL MEDICINE

## 2020-07-22 PROCEDURE — 93458 L HRT ARTERY/VENTRICLE ANGIO: CPT | Mod: 26 | Performed by: INTERNAL MEDICINE

## 2020-07-22 PROCEDURE — 40000852 ZZH STATISTIC HEART CATH LAB OR EP LAB

## 2020-07-22 PROCEDURE — 85730 THROMBOPLASTIN TIME PARTIAL: CPT | Performed by: INTERNAL MEDICINE

## 2020-07-22 PROCEDURE — 99152 MOD SED SAME PHYS/QHP 5/>YRS: CPT | Mod: 59 | Performed by: INTERNAL MEDICINE

## 2020-07-22 PROCEDURE — 25000132 ZZH RX MED GY IP 250 OP 250 PS 637: Performed by: INTERNAL MEDICINE

## 2020-07-22 PROCEDURE — 99153 MOD SED SAME PHYS/QHP EA: CPT | Performed by: INTERNAL MEDICINE

## 2020-07-22 PROCEDURE — 25000128 H RX IP 250 OP 636: Performed by: INTERNAL MEDICINE

## 2020-07-22 PROCEDURE — 25800030 ZZH RX IP 258 OP 636: Performed by: INTERNAL MEDICINE

## 2020-07-22 PROCEDURE — 85610 PROTHROMBIN TIME: CPT | Performed by: INTERNAL MEDICINE

## 2020-07-22 PROCEDURE — 27210794 ZZH OR GENERAL SUPPLY STERILE: Performed by: INTERNAL MEDICINE

## 2020-07-22 PROCEDURE — 85027 COMPLETE CBC AUTOMATED: CPT | Performed by: INTERNAL MEDICINE

## 2020-07-22 RX ORDER — FLUMAZENIL 0.1 MG/ML
0.2 INJECTION, SOLUTION INTRAVENOUS
Status: DISCONTINUED | OUTPATIENT
Start: 2020-07-22 | End: 2020-07-22 | Stop reason: HOSPADM

## 2020-07-22 RX ORDER — NITROGLYCERIN 5 MG/ML
VIAL (ML) INTRAVENOUS
Status: DISCONTINUED | OUTPATIENT
Start: 2020-07-22 | End: 2020-07-22 | Stop reason: HOSPADM

## 2020-07-22 RX ORDER — ACETAMINOPHEN 325 MG/1
650 TABLET ORAL EVERY 4 HOURS PRN
Status: DISCONTINUED | OUTPATIENT
Start: 2020-07-22 | End: 2020-07-22 | Stop reason: HOSPADM

## 2020-07-22 RX ORDER — FENTANYL CITRATE 50 UG/ML
INJECTION, SOLUTION INTRAMUSCULAR; INTRAVENOUS
Status: DISCONTINUED | OUTPATIENT
Start: 2020-07-22 | End: 2020-07-22 | Stop reason: HOSPADM

## 2020-07-22 RX ORDER — FENTANYL CITRATE 50 UG/ML
25-50 INJECTION, SOLUTION INTRAMUSCULAR; INTRAVENOUS
Status: DISCONTINUED | OUTPATIENT
Start: 2020-07-22 | End: 2020-07-22 | Stop reason: HOSPADM

## 2020-07-22 RX ORDER — SODIUM CHLORIDE 9 MG/ML
INJECTION, SOLUTION INTRAVENOUS CONTINUOUS
Status: DISCONTINUED | OUTPATIENT
Start: 2020-07-22 | End: 2020-07-22 | Stop reason: HOSPADM

## 2020-07-22 RX ORDER — LIDOCAINE 40 MG/G
CREAM TOPICAL
Status: DISCONTINUED | OUTPATIENT
Start: 2020-07-22 | End: 2020-07-22 | Stop reason: HOSPADM

## 2020-07-22 RX ORDER — NALOXONE HYDROCHLORIDE 0.4 MG/ML
.2-.4 INJECTION, SOLUTION INTRAMUSCULAR; INTRAVENOUS; SUBCUTANEOUS
Status: DISCONTINUED | OUTPATIENT
Start: 2020-07-22 | End: 2020-07-22 | Stop reason: HOSPADM

## 2020-07-22 RX ORDER — ATROPINE SULFATE 0.1 MG/ML
0.5 INJECTION INTRAVENOUS EVERY 5 MIN PRN
Status: DISCONTINUED | OUTPATIENT
Start: 2020-07-22 | End: 2020-07-22 | Stop reason: HOSPADM

## 2020-07-22 RX ORDER — ASPIRIN 81 MG/1
324 TABLET, CHEWABLE ORAL DAILY
Status: DISCONTINUED | OUTPATIENT
Start: 2020-07-22 | End: 2020-07-22 | Stop reason: HOSPADM

## 2020-07-22 RX ORDER — NALOXONE HYDROCHLORIDE 0.4 MG/ML
.1-.4 INJECTION, SOLUTION INTRAMUSCULAR; INTRAVENOUS; SUBCUTANEOUS
Status: DISCONTINUED | OUTPATIENT
Start: 2020-07-22 | End: 2020-07-22 | Stop reason: HOSPADM

## 2020-07-22 RX ADMIN — FENTANYL CITRATE 50 MCG: 50 INJECTION, SOLUTION INTRAMUSCULAR; INTRAVENOUS at 10:02

## 2020-07-22 RX ADMIN — FENTANYL CITRATE 50 MCG: 50 INJECTION, SOLUTION INTRAMUSCULAR; INTRAVENOUS at 09:57

## 2020-07-22 RX ADMIN — LIDOCAINE HYDROCHLORIDE 10 ML: 10 INJECTION, SOLUTION EPIDURAL; INFILTRATION; INTRACAUDAL; PERINEURAL at 10:06

## 2020-07-22 RX ADMIN — MIDAZOLAM 1 MG: 1 INJECTION INTRAMUSCULAR; INTRAVENOUS at 09:57

## 2020-07-22 RX ADMIN — ASPIRIN 325 MG: 325 TABLET, DELAYED RELEASE ORAL at 09:43

## 2020-07-22 RX ADMIN — MIDAZOLAM 1 MG: 1 INJECTION INTRAMUSCULAR; INTRAVENOUS at 10:02

## 2020-07-22 RX ADMIN — NITROGLYCERIN 200 MCG: 5 INJECTION, SOLUTION INTRAVENOUS at 10:12

## 2020-07-22 RX ADMIN — SODIUM CHLORIDE: 9 INJECTION, SOLUTION INTRAVENOUS at 08:39

## 2020-07-22 RX ADMIN — ASPIRIN 81 MG 324 MG: 81 TABLET ORAL at 09:42

## 2020-07-22 ASSESSMENT — MIFFLIN-ST. JEOR: SCORE: 2035.02

## 2020-07-22 NOTE — PROGRESS NOTES
Care Suites Post Procedure Note    Patient Information  Name: Aries Hilario  Age: 65 year old    Post Procedure  Time patient returned to Care Suites: 1040  Concerns/abnormal assessment: none  If abnormal assessment, provider notified: N/A  Plan/Other: Drsg to right groin CDI, soft and flat.  No pain.  VSS at baseline.  MD here to talk with pt and friend Kasia.  Will cont to monitor, call light in reach.    Mary Colunga RN

## 2020-07-22 NOTE — PROGRESS NOTES
PATIENT/VISITOR WELLNESS SCREENING    Step 1 Patient Screening    1. In the last month, have you been in contact with someone who was confirmed or suspected to have Coronavirus/COVID-19? No    2. Do you have the following symptoms?  Fever/Chills? No   Cough? No   Shortness of breath? No   New loss of taste or smell? No  Sore throat? No  Muscle or body aches? No  Headaches? No  Fatigue? No  Vomiting or diarrhea? No    Step 2 Visitor Screening    1. Name of Visitor (1 visitor per patient): Kasia    2. In the last month, have you been in contact with someone who was confirmed or suspected to have Coronavirus/COVID-19? No    3. Do you have the following symptoms?  Fever/Chills? No   Cough? No   Shortness of breath? No   Skin rash? No   Loss of taste or smell? No  Sore throat? No  Runny or stuffy nose? No  Muscle or body aches? No  Headaches? No  Fatigue? No  Vomiting or diarrhea? No    If the visitor has positive symptoms, notify supervisor/manger  Per policy, the visitor will need to leave the facility     Step 3 Refer to logic grid below for actions    NO SYMPTOM(S)    ACTIONS:  1. Standard rooming process  2. Provider to assess per normal protocol  3. Implement precautions as needed and per guidelines     POSITIVE SYMPTOM(S)  If positive for ANY of the following symptoms: fever, cough, shortness of breath, rash    ACTION:  1. Continue to have the patient wear a mask   2. Room patient as soon as possible  3. Don appropriate PPE when entering room  4. Provider evaluation

## 2020-07-22 NOTE — RESULT ENCOUNTER NOTE
No culprit lesion found; elevated diastolic BP, freq PVCs, EF 38-45%. Need to schedule follow up visit

## 2020-07-22 NOTE — PROGRESS NOTES
Will discuss this with patient at OV including addition of zetia and/or increasing atorvastatin. Based on repeat BP during OV can consider BP medication changes as noted above. Will need to evaluate frequent PVCs as well. TBD with patient.     Dr. Patterson       Patient currently not scheduled for post angio OV. Sent message to scheduling to get this arranged within 7-10 days.     WENDY Bueno July 22, 2020 2:09 PM

## 2020-07-22 NOTE — DISCHARGE INSTRUCTIONS
Cardiac Angiogram Discharge Instructions - Femoral    After you go home:      Have an adult stay with you until tomorrow.    Drink extra fluids for 2 days.    You may resume your normal diet.    No smoking       For 24 hours - due to the sedation you received:    Relax and take it easy.    Do NOT make any important or legal decisions.    Do NOT drive or operate machines at home or at work.    Do NOT drink alcohol.    Care of Groin Puncture Site:      For the first 24 hrs - check the puncture site every 1-2 hours while awake.    For 2 days, when you cough, sneeze, laugh or move your bowels, hold your hand over the puncture site and press firmly.    Remove the bandaid after 24 hours. If there is minor oozing, apply another bandaid and remove it after 12 hours.    It is normal to have a small bruise or pea size lump at the site.    You may shower tomorrow. Do NOT take a bath, or use a hot tub or pool for at least 3 days. Do NOT scrub the site. Do not use lotion or powder near the puncture site.    Activity:            For 2 days:    No stooping or squatting    Do NOT do any heavy activity such as exercise, lifting, or straining.     No housework, yard work or any activity that make you sweat    Do NOT lift more than 10 pounds    Bleeding:      If you start bleeding from the site in your groin, lie down flat and press firmly on/above the site for 10 minutes.     Once bleeding stops, lay flat for 2 hours.     Call [facility] as soon as you can.       Call 911 right away if you have heavy bleeding or bleeding that does not stop.      Medicines:      If you are taking an antiplatelet medication such as Plavix, Brilinta or Effient, do not stop taking it until you talk to your cardiologist.      Take your medications, including blood thinners, unless your provider tells you not to.      If you have stopped any medicines, check with your provider about when to restart them.    Follow Up Appointments:      Follow up with [facility]  Heart Nurse Practitioner at [facility] of patient preference in 7-10 days.    Call the clinic if:      You have increased pain or a large or growing hard lump around the site.    The site is red, swollen, hot or tender.    Blood or fluid is draining from the site.    You have chills or a fever greater than 101 F (38 C).    Your leg feels numb, cool or changes color.    You have hives, a rash or unusual itching.    New pain in the back or belly that you cannot control with Tylenol.    Any questions or concerns.          [facility] [place]:    [phone] [facility] (7 days a week)

## 2020-07-22 NOTE — PROGRESS NOTES
Care Suites Admission Nursing Note    Patient Information  Name: Aries Hilario  Age: 65 year old  Reason for admission: coronary angiogram  Care Suites arrival time: 0806    Patient Admission/Assessment   Pre-procedure assessment complete: Yes  If abnormal assessment/labs, provider notified: No  NPO: Yes  Medications held per instructions/orders: Yes  Consent: deferred  If applicable, pregnancy test status: deferred  Patient oriented to room: Yes  Education/questions answered: Yes  Plan/other: angiogram at 1000    Discharge Planning  Accompanied by: friend Kasia  Discharge name/phone number: Kasia [phone]  Overnight post sedation caregiver: Kasia Lomax location: home    Mary Colunga RN

## 2020-07-22 NOTE — PROGRESS NOTES
Care Suites Discharge Nursing Note    Patient Information  Name: Aries Hilario  Age: 65 year old    Discharge Education:  Discharge instructions reviewed: Yes  Additional education/resources provided: na  Patient/patient representative verbalizes understanding: Yes  Patient discharging on new medications: No  Medication education completed: N/A    Discharge Plans:   Discharge location: home  Discharge ride contacted: Yes  Approximate discharge time: 1315    Discharge Criteria:  Discharge criteria met and vital signs stable: Yes    Patient Belongs:  Patient belongings returned to patient: Yes    Jimmy Cassidy RN

## 2020-07-22 NOTE — PRE-PROCEDURE
GENERAL PRE-PROCEDURE:   Procedure:  Heart cath poss intervention  Date/Time:  7/22/2020 9:29 AM    Written consent obtained?: Yes    Risks and benefits: Risks, benefits and alternatives were discussed    Consent given by:  Patient  Patient states understanding of procedure being performed: Yes    Patient's understanding of procedure matches consent: Yes    Procedure consent matches procedure scheduled: Yes    Expected level of sedation:  Moderate  Appropriately NPO:  Yes  ASA Class:  Class 2- mild systemic disease, no acute problems, no functional limitations  Lungs:  Lungs clear with good breath sounds bilaterally  Heart:  Normal heart sounds and rate  History & Physical reviewed:  History and physical reviewed and no updates needed  Statement of review:  I have reviewed the lab findings, diagnostic data, medications, and the plan for sedation

## 2020-07-29 ENCOUNTER — VIRTUAL VISIT (OUTPATIENT)
Dept: CARDIOLOGY | Facility: CLINIC | Age: 65
End: 2020-07-29
Attending: INTERNAL MEDICINE
Payer: COMMERCIAL

## 2020-07-29 DIAGNOSIS — E78.5 HYPERLIPIDEMIA LDL GOAL <70: ICD-10-CM

## 2020-07-29 DIAGNOSIS — I25.118 CORONARY ARTERY DISEASE OF NATIVE ARTERY OF NATIVE HEART WITH STABLE ANGINA PECTORIS (H): ICD-10-CM

## 2020-07-29 DIAGNOSIS — I49.3 PVC'S (PREMATURE VENTRICULAR CONTRACTIONS): ICD-10-CM

## 2020-07-29 DIAGNOSIS — I42.9 CARDIOMYOPATHY, UNSPECIFIED TYPE (H): Primary | ICD-10-CM

## 2020-07-29 LAB — INTERPRETATION ECG - MUSE: NORMAL

## 2020-07-29 PROCEDURE — 99443 ZZC PHYSICIAN TELEPHONE EVALUATION 21-30 MIN: CPT | Performed by: NURSE PRACTITIONER

## 2020-07-29 NOTE — PROGRESS NOTES
"    Aries Hilario is a 65 year old male who is being evaluated via a billable telephone visit.      The patient has been notified of following:     \"This telephone visit will be conducted via a call between you and your physician/provider. We have found that certain health care needs can be provided without the need for a physical exam.  This service lets us provide the care you need with a short phone conversation.  If a prescription is necessary we can send it directly to your pharmacy.  If lab work is needed we can place an order for that and you can then stop by our lab to have the test done at a later time.    Telephone visits are billed at different rates depending on your insurance coverage. During this emergency period, for some insurers they may be billed the same as an in-person visit.  Please reach out to your insurance provider with any questions.    If during the course of the call the physician/provider feels a telephone visit is not appropriate, you will not be charged for this service.\"    Patient has given verbal consent for Telephone visit?  Yes    What phone number would you like to be contacted at? [phone]    How would you like to obtain your AVS? [facility]       Phone call duration: 25 minutes    [facility]      Cardiology Clinic Progress Note  Aries Hilario MRN# [id number]   YOB: 1955 Age: 61 year old     Primary Cardiologist:   Dr. Sotomayor           History of Presenting Illness:    Aries Hilario is a pleasant 61 year old patient with   1.  CAD   2.  Hyperlipidemia  3. Cardiomyopathy   4. PVCs   He has a past medical history of hepatitis C, depression, anxiety, and peripheral neuropathy.      He was hospitalized in 2016 with complaints of chest pressure and radiation down both arms associated with shortness of breath, palpitations, dizziness, lightheadedness, nausea, and diaphoresis.  Coronary angiogram on 9/2/2016 showed 100% thrombotic " "occlusion of the mid circumflex with successful CHAVA placed.  His echocardiogram showed EF 55-60% with normal left and right ventricular size and function, and no significant valvular abnormalities.  He did have a mildly dilated ascending aorta at 3.9 cm with borderline aortic root dilatation of 3.6 cm.   He subsequently presented to the hospital in October 2016 with GI bleed and anemia in the setting of multiple ulcers with the use of dual antiplatelet therapy.  The GI consult recommended PPI indefinitely. In 2017 he underwent repeat coronary angiogram for unclear symptoms.  This showed no culprit lesions and no new disease.      In May 2020 Pt's wife called on the patient's behalf noting that he had reported chest pain and tightness and wanted an in clinic appointment.  She noted the patient was refusing going to the ER.  My nurse called back and noted that due to COVID-19 video visit with Dr. Sotomayor was recommended and then could order testing needed.  The patient again noted that he want to see Dr. Sotomayor in person.  The patient elected to call [facility] to see if Dr. Sotomayor had any availability. The patient's wife Kasia called back on 6/2/2029 reporting he had ongoing chest pain with exertion for some time, but that the patient adamantly refused to go to the ED. The patient called back again on June 19 noting his ongoing problems and \"lack of care\" given that he was not able to be seen in clinic.  He agreed to set up a phone visit.     Patient was last seen by Dr. Patterson on 7/15/2020.  He reported increasing and worsening chest pain over the prior few months which was previously exertional and now also at rest.  It radiated to his arm and felt similar to his prior MI.  She recommended proceeding with coronary angiogram.  She also recommended a GI consult.    Pt presents today, for angiogram follow-up.  He underwent coronary angiogram 7/22/2020 and was found to have stable disease with patent LCx stent, " non-flow-limiting mild to moderate disease in the LAD/diagonal and mild disease in the RCA.  He was noted to have frequent PVCs and felt there was a decrease in his EF along with elevated diastolic BP.  They recommended considering increasing atorvastatin or adding Zetia to get to goal LDL.  He also recommended increasing lisinopril or adding spironolactone for his hypertension and decreased EF.  Consider assessing PVC burden to see if this is contributing to decreased EF.  Consider sleep study to assess for sleep apnea.      Since he was last seen, the patient continues with chest discomfort.  He mentioned that this possibly may be related to a pulled muscle.  He has gotten a bike and has started biking on the [place] with a group of his friends.  He has also been doing work around the farm.  He has not had a GI consultation yet.  I recommended he set this up given his history, as it could be the cause of his chest pain.  I explained the diagnosis of cardiomyopathy and gave recommendations for starting lisinopril, increasing atorvastatin, Zio patch, and sleep medicine consultation.  Unfortunately, he was very resistant and wanted to discuss all of this with his wife Kasia.  Unfortunately, she was at work and not available during our visit today.  I recommended that they call the cardiology nurses should they have any questions about my recommendations.  If they are agreeable to proceeding, we can then place these orders. Patient reports no shortness of breath, PND, orthopnea, presyncope, syncope, edema, heart racing, or palpitations.    Current Cardiac Medications   Atorvastatin 40 mg daily  Plavix 25 mg daily  Metoprolol XL 12.5 mg daily  Nitroglycerin PRN                   Assessment and Plan:     Plan  I made recommendations but patient would like to review this with his wife and will call to let us know if he is okay with recommendations before ordering.  1.  Start lisinopril 2.5 mg daily for CM and  HTN  2.  Increase atorvastatin to 80 mg daily for goal LDL  3.  7-day ZIO patch to assess PVC burden as possible cause of cardiomyopathy  4.  Sleep medicine consultation  5.  Follow-up with Dr. Patterson in 1 month      1. CAD    Angiogram 9/2016 100% thrombotic occlusion of mid Cx, s/p CHAVA. Residual disease mLAD 30%, dLAD mild diffuse disease, ostial D1 40% followed by mild diffuse, D2 40%, and mRCA 30%     Angio 2017 patent stents and no new disease     Angio 7/2020 patent stents and no obstructive disease     Chest pain (possibly GI related)    Continue statin, Plavix, beta-blocker    No ASA due to history of GI bleed with ulcers on DAPT      2. H/o GI bleed     After starting DAPT s/p stenting, now on monotherapy with plavix     GI ok with continuing plavix and holding ASA        3. Hyperlipidemia    Last LDL 99 on 7/2019    Continue atorvastatin 40 mg daily    Recommended increasing to 80 mg daily, but not agreeable at this time       4.  Cardiomyopathy    EF 40-45% on LV gram 7/2020    Unknown etiology, possibly frequent PVCs versus sleep apnea    No symptoms of heart failure    Continue metoprolol    Patient not agreeable to starting lisinopril, at this time    Uptitrate cardiomyopathy medications, as tolerated and reassess with echocardiogram in 3 months         Thank you for allowing me to participate in this delightful patient's care.      This note was completed in part using Dragon voice recognition software. Although reviewed after completion, some word and grammatical errors may occur.    Suyapa Nava, APRN, CNP         HPI and Plan:   See dictation    Orders Placed This Encounter   Procedures     Follow-Up with Cardiologist       No orders of the defined types were placed in this encounter.      There are no discontinued medications.      Encounter Diagnoses   Name Primary?     Hyperlipidemia LDL goal <70      Coronary artery disease of native artery of native heart with stable angina pectoris  (H)      Cardiomyopathy, unspecified type (H) Yes     PVC's (premature ventricular contractions)        CURRENT MEDICATIONS:  Current Outpatient Medications   Medication Sig Dispense Refill     ASPIRIN NOT PRESCRIBED (INTENTIONAL) Patient not taking due to PMHx ulcer       atorvastatin (LIPITOR) 40 MG tablet Take 1 tablet (40 mg) by mouth daily 90 tablet 3     clopidogrel (PLAVIX) 75 MG tablet Take 1 tablet (75 mg) by mouth daily 90 tablet 3     fluticasone (FLONASE) 50 MCG/ACT spray Spray 1-2 sprays into both nostrils daily 48 g 3     metoprolol succinate ER (TOPROL-XL) 25 MG 24 hr tablet TAKE ONE-HALF TABLET BY MOUTH DAILY 45 tablet 3     nitroGLYcerin (NITROSTAT) 0.4 MG sublingual tablet For chest pain place 1 tablet under the tongue every 5 minutes for 3 doses. If symptoms persist 5 minutes after 1st dose call 911. 39 tablet 3       ALLERGIES     Allergies   Allergen Reactions     Penicillins Hives       PAST MEDICAL HISTORY:  Past Medical History:   Diagnosis Date     Depression with anxiety      Hepatitis C      Peripheral neuropathy        PAST SURGICAL HISTORY:  Past Surgical History:   Procedure Laterality Date     APPENDECTOMY       COLONOSCOPY N/A 9/29/2014    Procedure: COLONOSCOPY;  Surgeon: Néstor Steele MD;  Location: [facility]     CV CORONARY ANGIOGRAM N/A 7/22/2020    Procedure: Coronary Angiogram;  Surgeon: Eric Escobar MD;  Location: [facility] CARDIAC CATH LAB     CV LEFT HEART CATH Left 7/22/2020    Procedure: Left Heart Cath;  Surgeon: Eric Escobar MD;  Location: [facility] CARDIAC CATH LAB     CV LEFT VENTRICULOGRAM N/A 7/22/2020    Procedure: Left Ventriculogram;  Surgeon: Eric Escobar MD;  Location: [facility] CARDIAC CATH LAB     HAND SURGERY       LAPAROSCOPIC HERNIORRHAPHY INGUINAL BILATERAL Bilateral 12/1/2014    Procedure: LAPAROSCOPIC HERNIORRHAPHY INGUINAL BILATERAL;  Surgeon: Navid Almendarez MD;  Location: [facility]       FAMILY HISTORY:  Family History   Problem Relation  Age of Onset     Depression Father         suicide     Coronary Artery Disease Brother         MI in his 60s     Asthma No family hx of      Diabetes No family hx of      Hypertension No family hx of      Cerebrovascular Disease No family hx of      Cancer - colorectal No family hx of      Prostate Cancer No family hx of        SOCIAL HISTORY:  Social History     Socioeconomic History     Marital status:      Spouse name: None     Number of children: None     Years of education: None     Highest education level: None   Occupational History     None   Social Needs     Financial resource strain: None     Food insecurity     Worry: None     Inability: None     Transportation needs     Medical: None     Non-medical: None   Tobacco Use     Smoking status: Light Tobacco Smoker     Types: Cigarettes     Smokeless tobacco: Never Used   Substance and Sexual Activity     Alcohol use: Yes     Comment: rarely     Drug use: No     Sexual activity: Yes     Partners: Female   Lifestyle     Physical activity     Days per week: None     Minutes per session: None     Stress: None   Relationships     Social connections     Talks on phone: None     Gets together: None     Attends Hindu service: None     Active member of club or organization: None     Attends meetings of clubs or organizations: None     Relationship status: None     Intimate partner violence     Fear of current or ex partner: None     Emotionally abused: None     Physically abused: None     Forced sexual activity: None   Other Topics Concern     Parent/sibling w/ CABG, MI or angioplasty before 65F 55M? No   Social History Narrative     None       Review of Systems:  Skin:  Negative       Eyes:  Positive for glasses    ENT:  Negative      Respiratory:  Negative       Cardiovascular:  Negative      Gastroenterology: Negative      Genitourinary:  Positive for urinary frequency;urgency;prostate problem    Musculoskeletal:  Negative nocturnal cramping;muscular  weakness;joint pain;joint swelling    Neurologic:  Positive for numbness or tingling of hands;numbness or tingling of feet;memory problems;incoordination Neuropathy  Psychiatric:  Positive for anxiety Panic Attacks  Heme/Lymph/Imm:  Positive for night sweats    Endocrine:  Negative        Physical Exam:  Vitals: There were no vitals taken for this visit.    Constitutional:           Skin:             Head:           Eyes:           Lymph:      ENT:           Neck:           Respiratory:            Cardiac:                                                           GI:           Extremities and Muscular Skeletal:                 Neurological:           Psych:           CC  Katherine Sotomayor MD  [id number]  [place] [id number]

## 2020-07-29 NOTE — LETTER
7/29/2020    Elsa Hutchinson MD  [address] [place] [address]    RE: Aries Hilario       Dear Colleague,    I had the pleasure of seeing Aries Hilario in the [facility].      Aries Hilario is a 65 year old male who is being evaluated via a billable telephone visit.        Cardiology Clinic Progress Note  Aries Hilario MRN# [id number]   YOB: 1955 Age: 61 year old     Primary Cardiologist:   Dr. Sotomayor           History of Presenting Illness:    Aries Hilario is a pleasant 61 year old patient with   1.  CAD   2.  Hyperlipidemia  3. Cardiomyopathy   4. PVCs   He has a past medical history of hepatitis C, depression, anxiety, and peripheral neuropathy.      He was hospitalized in 2016 with complaints of chest pressure and radiation down both arms associated with shortness of breath, palpitations, dizziness, lightheadedness, nausea, and diaphoresis.  Coronary angiogram on 9/2/2016 showed 100% thrombotic occlusion of the mid circumflex with successful CHAVA placed.  His echocardiogram showed EF 55-60% with normal left and right ventricular size and function, and no significant valvular abnormalities.  He did have a mildly dilated ascending aorta at 3.9 cm with borderline aortic root dilatation of 3.6 cm.   He subsequently presented to the hospital in October 2016 with GI bleed and anemia in the setting of multiple ulcers with the use of dual antiplatelet therapy.  The GI consult recommended PPI indefinitely. In 2017 he underwent repeat coronary angiogram for unclear symptoms.  This showed no culprit lesions and no new disease.      In May 2020 Pt's wife called on the patient's behalf noting that he had reported chest pain and tightness and wanted an in clinic appointment.  She noted the patient was refusing going to the ER.  My nurse called back and noted that due to COVID-19 video visit with Dr. Sotomayor was recommended and then could order testing needed.  The  "patient again noted that he want to see Dr. Sotomayor in person.  The patient elected to call [facility] to see if Dr. Sotomayor had any availability. The patient's wife Kasia called back on 6/2/2029 reporting he had ongoing chest pain with exertion for some time, but that the patient adamantly refused to go to the ED. The patient called back again on June 19 noting his ongoing problems and \"lack of care\" given that he was not able to be seen in clinic.  He agreed to set up a phone visit.     Patient was last seen by Dr. Ptaterson on 7/15/2020.  He reported increasing and worsening chest pain over the prior few months which was previously exertional and now also at rest.  It radiated to his arm and felt similar to his prior MI.  She recommended proceeding with coronary angiogram.  She also recommended a GI consult.    Pt presents today, for angiogram follow-up.  He underwent coronary angiogram 7/22/2020 and was found to have stable disease with patent LCx stent, non-flow-limiting mild to moderate disease in the LAD/diagonal and mild disease in the RCA.  He was noted to have frequent PVCs and felt there was a decrease in his EF along with elevated diastolic BP.  They recommended considering increasing atorvastatin or adding Zetia to get to goal LDL.  He also recommended increasing lisinopril or adding spironolactone for his hypertension and decreased EF.  Consider assessing PVC burden to see if this is contributing to decreased EF.  Consider sleep study to assess for sleep apnea.      Since he was last seen, the patient continues with chest discomfort.  He mentioned that this possibly may be related to a pulled muscle.  He has gotten a bike and has started biking on the [place] with a group of his friends.  He has also been doing work around the farm.  He has not had a GI consultation yet.  I recommended he set this up given his history, as it could be the cause of his chest pain.  I explained the diagnosis of " cardiomyopathy and gave recommendations for starting lisinopril, increasing atorvastatin, Zio patch, and sleep medicine consultation.  Unfortunately, he was very resistant and wanted to discuss all of this with his wife Kasia.  Unfortunately, she was at work and not available during our visit today.  I recommended that they call the cardiology nurses should they have any questions about my recommendations.  If they are agreeable to proceeding, we can then place these orders. Patient reports no shortness of breath, PND, orthopnea, presyncope, syncope, edema, heart racing, or palpitations.    Current Cardiac Medications   Atorvastatin 40 mg daily  Plavix 25 mg daily  Metoprolol XL 12.5 mg daily  Nitroglycerin PRN                   Assessment and Plan:     Plan  I made recommendations but patient would like to review this with his wife and will call to let us know if he is okay with recommendations before ordering.  1.  Start lisinopril 2.5 mg daily for CM and HTN  2.  Increase atorvastatin to 80 mg daily for goal LDL  3.  7-day ZIO patch to assess PVC burden as possible cause of cardiomyopathy  4.  Sleep medicine consultation  5.  Follow-up with Dr. Patterson in 1 month      1. CAD    Angiogram 9/2016 100% thrombotic occlusion of mid Cx, s/p CHAVA. Residual disease mLAD 30%, dLAD mild diffuse disease, ostial D1 40% followed by mild diffuse, D2 40%, and mRCA 30%     Angio 2017 patent stents and no new disease     Angio 7/2020 patent stents and no obstructive disease     Chest pain (possibly GI related)    Continue statin, Plavix, beta-blocker    No ASA due to history of GI bleed with ulcers on DAPT      2. H/o GI bleed     After starting DAPT s/p stenting, now on monotherapy with plavix     GI ok with continuing plavix and holding ASA        3. Hyperlipidemia    Last LDL 99 on 7/2019    Continue atorvastatin 40 mg daily    Recommended increasing to 80 mg daily, but not agreeable at this time       4.  Cardiomyopathy    EF  40-45% on LV gram 7/2020    Unknown etiology, possibly frequent PVCs versus sleep apnea    No symptoms of heart failure    Continue metoprolol    Patient not agreeable to starting lisinopril, at this time    Uptitrate cardiomyopathy medications, as tolerated and reassess with echocardiogram in 3 months         Thank you for allowing me to participate in this delightful patient's care.      This note was completed in part using Dragon voice recognition software. Although reviewed after completion, some word and grammatical errors may occur.    Suyapa Nava, APRN, CNP         HPI and Plan:   See dictation    Orders Placed This Encounter   Procedures     Follow-Up with Cardiologist       No orders of the defined types were placed in this encounter.      There are no discontinued medications.      Encounter Diagnoses   Name Primary?     Hyperlipidemia LDL goal <70      Coronary artery disease of native artery of native heart with stable angina pectoris (H)      Cardiomyopathy, unspecified type (H) Yes     PVC's (premature ventricular contractions)        CURRENT MEDICATIONS:  Current Outpatient Medications   Medication Sig Dispense Refill     ASPIRIN NOT PRESCRIBED (INTENTIONAL) Patient not taking due to PMHx ulcer       atorvastatin (LIPITOR) 40 MG tablet Take 1 tablet (40 mg) by mouth daily 90 tablet 3     clopidogrel (PLAVIX) 75 MG tablet Take 1 tablet (75 mg) by mouth daily 90 tablet 3     fluticasone (FLONASE) 50 MCG/ACT spray Spray 1-2 sprays into both nostrils daily 48 g 3     metoprolol succinate ER (TOPROL-XL) 25 MG 24 hr tablet TAKE ONE-HALF TABLET BY MOUTH DAILY 45 tablet 3     nitroGLYcerin (NITROSTAT) 0.4 MG sublingual tablet For chest pain place 1 tablet under the tongue every 5 minutes for 3 doses. If symptoms persist 5 minutes after 1st dose call 911. 25 tablet 3       ALLERGIES     Allergies   Allergen Reactions     Penicillins Hives       PAST MEDICAL HISTORY:  Past Medical History:    Diagnosis Date     Depression with anxiety      Hepatitis C      Peripheral neuropathy        PAST SURGICAL HISTORY:  Past Surgical History:   Procedure Laterality Date     APPENDECTOMY       COLONOSCOPY N/A 9/29/2014    Procedure: COLONOSCOPY;  Surgeon: Néstor Steele MD;  Location: [facility]     CV CORONARY ANGIOGRAM N/A 7/22/2020    Procedure: Coronary Angiogram;  Surgeon: Eric Escobar MD;  Location:  HEART CARDIAC CATH LAB     CV LEFT HEART CATH Left 7/22/2020    Procedure: Left Heart Cath;  Surgeon: Eric Escobar MD;  Location:  HEART CARDIAC CATH LAB     CV LEFT VENTRICULOGRAM N/A 7/22/2020    Procedure: Left Ventriculogram;  Surgeon: Eric Escobar MD;  Location:  HEART CARDIAC CATH LAB     HAND SURGERY       LAPAROSCOPIC HERNIORRHAPHY INGUINAL BILATERAL Bilateral 12/1/2014    Procedure: LAPAROSCOPIC HERNIORRHAPHY INGUINAL BILATERAL;  Surgeon: Navid Almendarez MD;  Location: [facility]       FAMILY HISTORY:  Family History   Problem Relation Age of Onset     Depression Father         suicide     Coronary Artery Disease Brother         MI in his 60s     Asthma No family hx of      Diabetes No family hx of      Hypertension No family hx of      Cerebrovascular Disease No family hx of      Cancer - colorectal No family hx of      Prostate Cancer No family hx of        SOCIAL HISTORY:  Social History     Socioeconomic History     Marital status:      Spouse name: None     Number of children: None     Years of education: None     Highest education level: None   Occupational History     None   Social Needs     Financial resource strain: None     Food insecurity     Worry: None     Inability: None     Transportation needs     Medical: None     Non-medical: None   Tobacco Use     Smoking status: Light Tobacco Smoker     Types: Cigarettes     Smokeless tobacco: Never Used   Substance and Sexual Activity     Alcohol use: Yes     Comment: rarely     Drug use: No     Sexual activity: Yes      Partners: Female   Lifestyle     Physical activity     Days per week: None     Minutes per session: None     Stress: None   Relationships     Social connections     Talks on phone: None     Gets together: None     Attends Protestant service: None     Active member of club or organization: None     Attends meetings of clubs or organizations: None     Relationship status: None     Intimate partner violence     Fear of current or ex partner: None     Emotionally abused: None     Physically abused: None     Forced sexual activity: None   Other Topics Concern     Parent/sibling w/ CABG, MI or angioplasty before 65F 55M? No   Social History Narrative     None       Review of Systems:  Skin:  Negative       Eyes:  Positive for glasses    ENT:  Negative      Respiratory:  Negative       Cardiovascular:  Negative      Gastroenterology: Negative      Genitourinary:  Positive for urinary frequency;urgency;prostate problem    Musculoskeletal:  Negative nocturnal cramping;muscular weakness;joint pain;joint swelling    Neurologic:  Positive for numbness or tingling of hands;numbness or tingling of feet;memory problems;incoordination Neuropathy  Psychiatric:  Positive for anxiety Panic Attacks  Heme/Lymph/Imm:  Positive for night sweats    Endocrine:  Negative        Physical Exam:  Vitals: There were no vitals taken for this visit.    Constitutional:           Skin:             Head:           Eyes:           Lymph:      ENT:           Neck:           Respiratory:            Cardiac:                                                           GI:           Extremities and Muscular Skeletal:                 Neurological:           Psych:           Thank you for allowing me to participate in the care of your patient.    Sincerely,     [facility]

## 2020-07-29 NOTE — PATIENT INSTRUCTIONS
Medication Changes:  RECOMMEND:   STARTING lisinopril 2.5 mg daily for weak heart   INCREASING atorvastatin to 80 mg daily for cholesterol     Recommendations:  1.  During your angiogram, they saw that your heart was weak (diagnosis of cardiomyopathy).  In order to strengthen the heart we recommend metoprolol and lisinopril.  You are already on metoprolol and we would recommend starting lisinopril to help strengthen the heart.  We then increase both of these medications as much as you can tolerate and then recheck the pumping function of your heart with an echocardiogram in a few months.  If the weak heart is not treated, you are at risk for developing heart failure (retaining fluid) in the future. You can find more information about this on [employer] or American Heart Association website.   2.  While you had your angiogram, they saw on your heart monitor that you are having a frequent amount of early beats.  It is recommended that you have a heart monitor that you wear for 1 week to assess how many early beats you are having.  If it is too many, this may be contributing to your weak heart and would be treated with medication.   3.  There was concern for possible sleep apnea and they recommended a sleep medicine consultation.  If you are found to have sleep apnea and it has been untreated this can also be a cause for the heart being weak.  4.  Your bad cholesterol (LDL) has been in the 90s, over the last 2 years.  The goal for patients with a history of plaque in the heart arteries is an LDL of less than 70.  In order to improve this number we recommend increasing atorvastatin to 80 mg daily.   5.  If you have any questions, please contact my nurses number and they can review these recommendations with you.  If you are agreeable to these recommendations we can send the prescriptions to the pharmacy and set up appointments.     Follow-up:  Call to schedule. See Dr. Patterson for cardiology follow up at [place]  [place]: 1 month (end of August 2020).     Cardiology Scheduling~[phone]  Cardiology Clinic RNs~[phone] (Carolann Ferrera RN and Chasity Sanchez RN)

## 2020-08-03 DIAGNOSIS — E78.5 HYPERLIPIDEMIA LDL GOAL <70: ICD-10-CM

## 2020-08-03 DIAGNOSIS — I21.4 NSTEMI (NON-ST ELEVATED MYOCARDIAL INFARCTION) (H): ICD-10-CM

## 2020-08-03 NOTE — TELEPHONE ENCOUNTER
Per last OV note 7/29/20: pt was to start lisinopril and increase losartan.    Plan  I made recommendations but patient would like to review this with his wife and will call to let us know if he is okay with recommendations before ordering.  1.  Start lisinopril 2.5 mg daily for CM and HTN  2.  Increase atorvastatin to 80 mg daily for goal LDL  3.  7-day ZIO patch to assess PVC burden as possible cause of cardiomyopathy  4.  Sleep medicine consultation  5.  Follow-up with Dr. Patterson in 1 month    LM for pt to call back.  YEISON NO RN on 8/3/2020 at 8:36 AM

## 2020-08-04 RX ORDER — ATORVASTATIN CALCIUM 40 MG/1
40 TABLET, FILM COATED ORAL DAILY
Qty: 90 TABLET | Refills: 3 | Status: SHIPPED | OUTPATIENT
Start: 2020-08-04 | End: 2020-08-06

## 2020-08-04 RX ORDER — METOPROLOL SUCCINATE 25 MG/1
TABLET, EXTENDED RELEASE ORAL
Qty: 45 TABLET | Refills: 3 | Status: SHIPPED | OUTPATIENT
Start: 2020-08-04 | End: 2020-08-06

## 2020-08-04 RX ORDER — CLOPIDOGREL BISULFATE 75 MG/1
75 TABLET ORAL DAILY
Qty: 90 TABLET | Refills: 3 | Status: SHIPPED | OUTPATIENT
Start: 2020-08-04 | End: 2020-08-06

## 2020-08-06 ENCOUNTER — MYC REFILL (OUTPATIENT)
Dept: CARDIOLOGY | Facility: CLINIC | Age: 65
End: 2020-08-06

## 2020-08-06 DIAGNOSIS — E78.5 HYPERLIPIDEMIA LDL GOAL <70: ICD-10-CM

## 2020-08-06 DIAGNOSIS — I21.4 NSTEMI (NON-ST ELEVATED MYOCARDIAL INFARCTION) (H): ICD-10-CM

## 2020-08-13 RX ORDER — ATORVASTATIN CALCIUM 40 MG/1
40 TABLET, FILM COATED ORAL DAILY
Qty: 90 TABLET | Refills: 3 | Status: SHIPPED | OUTPATIENT
Start: 2020-08-13 | End: 2021-08-16

## 2020-08-13 RX ORDER — METOPROLOL SUCCINATE 25 MG/1
TABLET, EXTENDED RELEASE ORAL
Qty: 45 TABLET | Refills: 3 | Status: SHIPPED | OUTPATIENT
Start: 2020-08-13 | End: 2021-08-16

## 2020-08-13 RX ORDER — CLOPIDOGREL BISULFATE 75 MG/1
75 TABLET ORAL DAILY
Qty: 90 TABLET | Refills: 3 | Status: SHIPPED | OUTPATIENT
Start: 2020-08-13 | End: 2021-08-16

## 2020-11-22 ENCOUNTER — HEALTH MAINTENANCE LETTER (OUTPATIENT)
Age: 65
End: 2020-11-22

## 2021-03-19 ENCOUNTER — IMMUNIZATION (OUTPATIENT)
Dept: FAMILY MEDICINE | Facility: CLINIC | Age: 66
End: 2021-03-19
Payer: COMMERCIAL

## 2021-03-19 PROCEDURE — 91301 PR COVID VAC MODERNA 100 MCG/0.5 ML IM: CPT

## 2021-03-19 PROCEDURE — 0011A PR COVID VAC MODERNA 100 MCG/0.5 ML IM: CPT

## 2021-04-16 ENCOUNTER — IMMUNIZATION (OUTPATIENT)
Dept: FAMILY MEDICINE | Facility: CLINIC | Age: 66
End: 2021-04-16
Attending: FAMILY MEDICINE
Payer: COMMERCIAL

## 2021-04-16 PROCEDURE — 91301 PR COVID VAC MODERNA 100 MCG/0.5 ML IM: CPT

## 2021-04-16 PROCEDURE — 0012A PR COVID VAC MODERNA 100 MCG/0.5 ML IM: CPT

## 2021-07-25 ENCOUNTER — HEALTH MAINTENANCE LETTER (OUTPATIENT)
Age: 66
End: 2021-07-25

## 2021-09-19 ENCOUNTER — HEALTH MAINTENANCE LETTER (OUTPATIENT)
Age: 66
End: 2021-09-19

## 2021-11-29 ENCOUNTER — IMMUNIZATION (OUTPATIENT)
Dept: FAMILY MEDICINE | Facility: CLINIC | Age: 66
End: 2021-11-29
Payer: COMMERCIAL

## 2021-11-29 PROCEDURE — 0064A COVID-19,PF,MODERNA (18+ YRS BOOSTER .25ML): CPT

## 2021-11-29 PROCEDURE — 91306 COVID-19,PF,MODERNA (18+ YRS BOOSTER .25ML): CPT

## 2022-01-24 DIAGNOSIS — I21.4 NSTEMI (NON-ST ELEVATED MYOCARDIAL INFARCTION) (H): ICD-10-CM

## 2022-01-25 RX ORDER — CLOPIDOGREL BISULFATE 75 MG/1
75 TABLET ORAL DAILY
Qty: 30 TABLET | Refills: 0 | Status: SHIPPED | OUTPATIENT
Start: 2022-01-25

## 2022-01-25 RX ORDER — METOPROLOL SUCCINATE 25 MG/1
TABLET, EXTENDED RELEASE ORAL
Qty: 15 TABLET | Refills: 0 | Status: SHIPPED | OUTPATIENT
Start: 2022-01-25

## 2022-01-25 NOTE — TELEPHONE ENCOUNTER
LOV 7/29/20.  No future OV scheduled. RN refilled rx per RN refill protocol.   Ledy Thompson RN on 1/25/2022 at 8:38 AM

## 2022-02-10 DIAGNOSIS — I21.4 NSTEMI (NON-ST ELEVATED MYOCARDIAL INFARCTION) (H): ICD-10-CM

## 2022-02-11 NOTE — TELEPHONE ENCOUNTER
[facility] Cardiology Refill Guideline reviewed.  Medication does not meet criteria for refill due to very overdue for follow up despite several attempts to schedule.  Messaged to providers team for further review. Refills not sent. Message to scheduling to reach out to patient to schedule follow up. MyChart message sent as well. Chasity Sanchez RN Cardiology February 11, 2022, 8:43 AM    ADDENDUM: Rec MyChart message back from patient stating he has transferred his care to [facility] and has an appointment to establish cardiology care with Dr Ewa valle. He will have him provide refills. Chasity Sanchez RN Cardiology February 14, 2022, 11:39 AM

## 2022-02-14 RX ORDER — CLOPIDOGREL BISULFATE 75 MG/1
75 TABLET ORAL DAILY
Qty: 30 TABLET | Refills: 0 | OUTPATIENT
Start: 2022-02-14

## 2022-02-14 RX ORDER — METOPROLOL SUCCINATE 25 MG/1
TABLET, EXTENDED RELEASE ORAL
Qty: 15 TABLET | Refills: 0 | OUTPATIENT
Start: 2022-02-14

## 2022-02-17 PROBLEM — I25.10 CORONARY ARTERY DISEASE INVOLVING NATIVE CORONARY ARTERY OF NATIVE HEART: Status: ACTIVE | Noted: 2018-09-10

## 2022-05-17 ENCOUNTER — VIRTUAL VISIT (OUTPATIENT)
Dept: INTERNAL MEDICINE | Facility: CLINIC | Age: 67
End: 2022-05-17
Payer: COMMERCIAL

## 2022-05-17 DIAGNOSIS — U07.1 INFECTION DUE TO 2019 NOVEL CORONAVIRUS: Primary | ICD-10-CM

## 2022-05-17 PROCEDURE — 99213 OFFICE O/P EST LOW 20 MIN: CPT | Mod: 95 | Performed by: INTERNAL MEDICINE

## 2022-05-17 RX ORDER — GUAIFENESIN/DEXTROMETHORPHAN 100-10MG/5
10 SYRUP ORAL EVERY 4 HOURS PRN
Qty: 473 ML | Refills: 1 | Status: SHIPPED | OUTPATIENT
Start: 2022-05-17

## 2022-05-17 NOTE — PROGRESS NOTES
Tin is a 66 year old who is being evaluated via a billable video visit.      How would you like to obtain your AVS? MyChart  If the video visit is dropped, the invitation should be resent by: Text to cell phone: [phone]  Will anyone else be joining your video visit? No    Video Start Time: 1:50 PM    Assessment & Plan     Infection due to 2019 novel coronavirus  Symptoms started ~3.5 days ago. Ongoing. CAD does elevate his risk. Given his known CAD, poor tolerance to aspirin, poor follow-up with cardiology clinic, and his reported symptoms of off/on left arm numbness (none currently or recently), I am concerned by the report that Paxlovid making clopidogrel not work as effectively and thus recommended against that medication. He was interested in trying molnupiravir which I did prescribe. Robitussin-DM prescribed for cough symptom control.  - molnupiravir (LAGEVRIO) 200 MG capsule; Take 4 capsules (800 mg) by mouth every 12 hours for 5 days  - guaiFENesin-dextromethorphan (ROBITUSSIN DM) 100-10 MG/5ML syrup; Take 10 mLs by mouth every 4 hours as needed for cough    F/u with regular PCP at regular interval or sooner PRN    Frederic Jama MD  [facility]   Tin is a 66 year old who presents for the following health issues:    COVID-19 Symptom Review  How many days ago did these symptoms start?  4 days  Are any of the following symptoms significant for you?    New or worsening difficulty breathing? No    Worsening cough? Yes, I am coughing up mucus.    Fever or chills? Yes    Headache: YES    Sore throat: no    Chest pain: no    Diarrhea: YES    Body aches? YES  What treatments has patient tried?  Ibuprofen    Does patient live in a nursing home, group home, or shelter? no  Does patient have a way to get food/medications during quarantined? Yes, I have a friend or family member who can help me.    History of MI. Tells me 'it feels like my stent is doing something in  there sometimes'. Reports a numb left arm off and on before this illness.    Review of Systems   Constitutional, HEENT, cardiovascular, pulmonary, gi systems are negative, except as otherwise noted.      Objective       Vitals: No vitals were obtained today due to virtual visit.    Physical Exam   GENERAL: Healthy, alert and no distress  EYES: Eyes grossly normal to inspection.  No discharge or erythema, or obvious scleral/conjunctival abnormalities.  RESP: No audible wheeze, cough, or visible cyanosis.  No visible retractions or increased work of breathing.    SKIN: Visible skin clear. No significant rash, abnormal pigmentation or lesions.  NEURO: Cranial nerves grossly intact.  Mentation and speech appropriate for age.  PSYCH: Mentation appears normal, affect normal/bright, judgement and insight intact, normal speech and appearance well-groomed.    Video-Visit Details    Type of service: Video Visit    Video End Time: 1:56 PM    Originating Location (pt. Location): Home    Distant Location (provider location):  [facility]     Platform used for Video Visit: Nabeel

## 2022-10-12 NOTE — Clinical Note
Procedure scheduled as Elective.   Topical Clindamycin Pregnancy And Lactation Text: This medication is Pregnancy Category B and is considered safe during pregnancy. It is unknown if it is excreted in breast milk.

## 2022-11-21 ENCOUNTER — HEALTH MAINTENANCE LETTER (OUTPATIENT)
Age: 67
End: 2022-11-21

## 2022-12-07 ENCOUNTER — IMMUNIZATION (OUTPATIENT)
Dept: FAMILY MEDICINE | Facility: CLINIC | Age: 67
End: 2022-12-07
Payer: COMMERCIAL

## 2022-12-07 PROCEDURE — 91313 COVID-19 VACCINE BIVALENT BOOSTER 18+ (MODERNA): CPT

## 2022-12-07 PROCEDURE — 0134A COVID-19 VACCINE BIVALENT BOOSTER 18+ (MODERNA): CPT

## 2023-11-25 ENCOUNTER — HEALTH MAINTENANCE LETTER (OUTPATIENT)
Age: 68
End: 2023-11-25

## 2024-05-07 ENCOUNTER — OFFICE VISIT (OUTPATIENT)
Dept: DERMATOLOGY | Facility: CLINIC | Age: 69
End: 2024-05-07
Payer: COMMERCIAL

## 2024-05-07 DIAGNOSIS — L82.1 SEBORRHEIC KERATOSES: ICD-10-CM

## 2024-05-07 DIAGNOSIS — L81.4 LENTIGO: ICD-10-CM

## 2024-05-07 DIAGNOSIS — D18.01 ANGIOMA OF SKIN: ICD-10-CM

## 2024-05-07 DIAGNOSIS — D23.9 DERMAL NEVUS: Primary | ICD-10-CM

## 2024-05-07 PROCEDURE — 99203 OFFICE O/P NEW LOW 30 MIN: CPT | Performed by: DERMATOLOGY

## 2024-05-07 NOTE — PROGRESS NOTES
Aries Hilario is an extremely pleasant 68 year old year old male patient here today for spots on face.  Patient has no other skin complaints today.  Remainder of the HPI, Meds, PMH, Allergies, FH, and SH was reviewed in chart.      Past Medical History:   Diagnosis Date    Depression with anxiety     Hepatitis C     Peripheral neuropathy        Past Surgical History:   Procedure Laterality Date    APPENDECTOMY      COLONOSCOPY N/A 9/29/2014    Procedure: COLONOSCOPY;  Surgeon: Néstor Steele MD;  Location: [facility]    CV CORONARY ANGIOGRAM N/A 7/22/2020    Procedure: Coronary Angiogram;  Surgeon: Eric Escobar MD;  Location:  HEART CARDIAC CATH LAB    CV LEFT HEART CATH Left 7/22/2020    Procedure: Left Heart Cath;  Surgeon: Eric Escobar MD;  Location:  HEART CARDIAC CATH LAB    CV LEFT VENTRICULOGRAM N/A 7/22/2020    Procedure: Left Ventriculogram;  Surgeon: Eric Escobar MD;  Location:  HEART CARDIAC CATH LAB    HAND SURGERY      LAPAROSCOPIC HERNIORRHAPHY INGUINAL BILATERAL Bilateral 12/1/2014    Procedure: LAPAROSCOPIC HERNIORRHAPHY INGUINAL BILATERAL;  Surgeon: Navid Almendarez MD;  Location: [facility]        Family History   Problem Relation Age of Onset    Depression Father         suicide    Coronary Artery Disease Brother         MI in his 60s    Asthma No family hx of     Diabetes No family hx of     Hypertension No family hx of     Cerebrovascular Disease No family hx of     Cancer - colorectal No family hx of     Prostate Cancer No family hx of        Social History     Socioeconomic History    Marital status:      Spouse name: Not on file    Number of children: Not on file    Years of education: Not on file    Highest education level: Not on file   Occupational History    Not on file   Tobacco Use    Smoking status: Former     Types: Cigarettes    Smokeless tobacco: Never   Substance and Sexual Activity    Alcohol use: Yes     Comment: rarely    Drug use: No    Sexual  activity: Yes     Partners: Female   Other Topics Concern    Parent/sibling w/ CABG, MI or angioplasty before 65F 55M? No   Social History Narrative    Not on file     Social Determinants of Health     Financial Resource Strain: High Risk (12/22/2021)    [facility]    Financial Resource Strain     Difficulty of Paying Living Expenses: Not on file     Difficulty of Paying Living Expenses: Not on file   Food Insecurity: Not on file   Transportation Needs: Not on file   Physical Activity: Not on file   Stress: Not on file   Social Connections: Unknown (12/22/2021)    Received from [facility], [facility]    Social Connections     Frequency of Communication with Friends and Family: Not on file   Interpersonal Safety: Not on file   Housing Stability: Not on file       Outpatient Encounter Medications as of 5/7/2024   Medication Sig Dispense Refill    ASPIRIN NOT PRESCRIBED (INTENTIONAL) Patient not taking due to PMHx ulcer      atorvastatin (LIPITOR) 40 MG tablet TAKE ONE TABLET BY MOUTH ONCE DAILY (Patient not taking: Reported on 5/17/2022) 90 tablet 0    clopidogrel (PLAVIX) 75 MG tablet Take 1 tablet (75 mg) by mouth daily No further refills until seen by cardiology--call [phone] to schedule 30 tablet 0    fluticasone (FLONASE) 50 MCG/ACT spray Spray 1-2 sprays into both nostrils daily 48 g 3    guaiFENesin-dextromethorphan (ROBITUSSIN DM) 100-10 MG/5ML syrup Take 10 mLs by mouth every 4 hours as needed for cough 473 mL 1    metoprolol succinate ER (TOPROL-XL) 25 MG 24 hr tablet TAKE ONE-HALF TABLET BY MOUTH EVERY DAY. No further refills until seen by cardiology--call [phone] to schedule (Patient not taking: Reported on 5/17/2022) 15 tablet 0    nitroGLYcerin (NITROSTAT) 0.4 MG sublingual tablet For chest pain place 1 tablet under the tongue every 5  minutes for 3 doses. If symptoms persist 5 minutes after 1st dose call 911. (Patient not taking: Reported on 5/17/2022) 25 tablet 3     No facility-administered encounter medications on file as of 5/7/2024.             O:   NAD, WDWN, Alert & Oriented, Mood & Affect wnl, Vitals stable   General appearance normal   Vitals stable   Alert, oriented and in no acute distress        Stuck on papules and brown macules on face, chest,   Red papules on neck, scalp   Flesh colored papules on face      Eyes: Conjunctivae/lids:Normal     ENT: Lips, mucosa: normal    MSK:Normal    Cardiovascular: peripheral edema none    Pulm: Breathing Normal    Lymph Nodes: No Head and Neck Lymphadenopathy     Neuro/Psych: Orientation:Alert and Orientedx3 ; Mood/Affect:normal       A/P:  1. Seborrheic keratosis, lentigo, angioma, dermal nevus  It was a pleasure speaking to Aries Hilario today.  Previous clinic notes and pertinent laboratory tests were reviewed prior to Aries Hilario's visit.  Nature and genetics of benign skin lesions dicussed with patient.  Signs and Symptoms of skin cancer discussed with patient.  Patient encouraged to perform monthly skin exams.  UV precautions reviewed with patient.  Return to clinic 12 months

## 2024-05-07 NOTE — LETTER
5/7/2024         RE: Aries Hilario  [id number] [facility] [id number]        Dear Colleague,    Thank you for referring your patient, Aries Hilario, to the [facility]. Please see a copy of my visit note below.    Aries Hilario is an extremely pleasant 68 year old year old male patient here today for spots on face.  Patient has no other skin complaints today.  Remainder of the HPI, Meds, PMH, Allergies, FH, and SH was reviewed in chart.      Past Medical History:   Diagnosis Date     Depression with anxiety      Hepatitis C      Peripheral neuropathy        Past Surgical History:   Procedure Laterality Date     APPENDECTOMY       COLONOSCOPY N/A 9/29/2014    Procedure: COLONOSCOPY;  Surgeon: Néstor Steele MD;  Location: [facility]     CV CORONARY ANGIOGRAM N/A 7/22/2020    Procedure: Coronary Angiogram;  Surgeon: Eric Escobar MD;  Location:  HEART CARDIAC CATH LAB     CV LEFT HEART CATH Left 7/22/2020    Procedure: Left Heart Cath;  Surgeon: Eric Escobar MD;  Location:  HEART CARDIAC CATH LAB     CV LEFT VENTRICULOGRAM N/A 7/22/2020    Procedure: Left Ventriculogram;  Surgeon: Eric Escobar MD;  Location:  HEART CARDIAC CATH LAB     HAND SURGERY       LAPAROSCOPIC HERNIORRHAPHY INGUINAL BILATERAL Bilateral 12/1/2014    Procedure: LAPAROSCOPIC HERNIORRHAPHY INGUINAL BILATERAL;  Surgeon: Navid Almendarez MD;  Location: [facility]        Family History   Problem Relation Age of Onset     Depression Father         suicide     Coronary Artery Disease Brother         MI in his 60s     Asthma No family hx of      Diabetes No family hx of      Hypertension No family hx of      Cerebrovascular Disease No family hx of      Cancer - colorectal No family hx of      Prostate Cancer No family hx of        Social History     Socioeconomic History     Marital status:      Spouse name: Not on file     Number of children: Not on file     Years of education: Not on file      Highest education level: Not on file   Occupational History     Not on file   Tobacco Use     Smoking status: Former     Types: Cigarettes     Smokeless tobacco: Never   Substance and Sexual Activity     Alcohol use: Yes     Comment: rarely     Drug use: No     Sexual activity: Yes     Partners: Female   Other Topics Concern     Parent/sibling w/ CABG, MI or angioplasty before 65F 55M? No   Social History Narrative     Not on file     Social Determinants of Health     Financial Resource Strain: High Risk (12/22/2021)    [facility], [facility] [facility]    Financial Resource Strain      Difficulty of Paying Living Expenses: Not on file      Difficulty of Paying Living Expenses: Not on file   Food Insecurity: Not on file   Transportation Needs: Not on file   Physical Activity: Not on file   Stress: Not on file   Social Connections: Unknown (12/22/2021)    Received from "[employer] [facility], [facility] [facility]    Social Connections      Frequency of Communication with Friends and Family: Not on file   Interpersonal Safety: Not on file   Housing Stability: Not on file       Outpatient Encounter Medications as of 5/7/2024   Medication Sig Dispense Refill     ASPIRIN NOT PRESCRIBED (INTENTIONAL) Patient not taking due to PMHx ulcer       atorvastatin (LIPITOR) 40 MG tablet TAKE ONE TABLET BY MOUTH ONCE DAILY (Patient not taking: Reported on 5/17/2022) 90 tablet 0     clopidogrel (PLAVIX) 75 MG tablet Take 1 tablet (75 mg) by mouth daily No further refills until seen by cardiology--call [phone] to schedule 30 tablet 0     fluticasone (FLONASE) 50 MCG/ACT spray Spray 1-2 sprays into both nostrils daily 48 g 3     guaiFENesin-dextromethorphan (ROBITUSSIN DM) 100-10 MG/5ML syrup Take 10 mLs by mouth every 4 hours as needed for cough 473 mL 1     metoprolol succinate ER (TOPROL-XL) 25 MG 24 hr  tablet TAKE ONE-HALF TABLET BY MOUTH EVERY DAY. No further refills until seen by cardiology--call [phone] to schedule (Patient not taking: Reported on 5/17/2022) 15 tablet 0     nitroGLYcerin (NITROSTAT) 0.4 MG sublingual tablet For chest pain place 1 tablet under the tongue every 5 minutes for 3 doses. If symptoms persist 5 minutes after 1st dose call 911. (Patient not taking: Reported on 5/17/2022) 25 tablet 3     No facility-administered encounter medications on file as of 5/7/2024.             O:   NAD, WDWN, Alert & Oriented, Mood & Affect wnl, Vitals stable   General appearance normal   Vitals stable   Alert, oriented and in no acute distress        Stuck on papules and brown macules on face, chest,   Red papules on neck, scalp   Flesh colored papules on face      Eyes: Conjunctivae/lids:Normal     ENT: Lips, mucosa: normal    MSK:Normal    Cardiovascular: peripheral edema none    Pulm: Breathing Normal    Lymph Nodes: No Head and Neck Lymphadenopathy     Neuro/Psych: Orientation:Alert and Orientedx3 ; Mood/Affect:normal       A/P:  1. Seborrheic keratosis, lentigo, angioma, dermal nevus  It was a pleasure speaking to Aries Hilario today.  Previous clinic notes and pertinent laboratory tests were reviewed prior to Aries Hilario's visit.  Nature and genetics of benign skin lesions dicussed with patient.  Signs and Symptoms of skin cancer discussed with patient.  Patient encouraged to perform monthly skin exams.  UV precautions reviewed with patient.  Return to clinic 12 months      Again, thank you for allowing me to participate in the care of your patient.        Sincerely,        Shaq Armijo MD

## 2024-05-07 NOTE — PATIENT INSTRUCTIONS
No concerns seen on face.    Patient Education       Proper skin care from [facility]:    -Eliminate harsh soaps as they strip the natural oils from the skin, often resulting in dry itchy skin ( i.e. Dial, Zest, [place] Spring)  -Use mild soaps such as Cetaphil or Dove Sensitive Skin in the shower. You do not need to use soap on arms, legs, and trunk every time you shower unless visibly soiled.   -Avoid hot or cold showers.  -After showering, lightly dry off and apply moisturizing within 2-3 minutes. This will help trap moisture in the skin.   -Aggressive use of a moisturizer at least 1-2 times a day to the entire body (including -Vanicream, Cetaphil, Aquaphor or Cerave) and moisturize hands after every washing.  -We recommend using moisturizers that come in a tub that needs to be scooped out, not a pump. This has more of an oil base. It will hold moisture in your skin much better than a water base moisturizer. The above recommended are non-pore clogging.      Wear a sunscreen with at least SPF 30 on your face, ears, neck and V of the chest daily. Wear sunscreen on other areas of the body if those areas are exposed to the sun throughout the day. Sunscreens can contain physical and/or chemical blockers. Physical blockers are less likely to clog pores, these include zinc oxide and titanium dioxide. Reapply every two hour and after swimming.     Sunscreen examples: https://www.ewg.org/sunscreen/    UV radiation  UVA radiation remains constant throughout the day and throughout the year. It is a longer wavelength than UVB and therefore penetrates deeper into the skin leading to immediate and delayed tanning, photoaging, and skin cancer. 70-80% of UVA and UVB radiation occurs between the hours of 10am-2pm.  UVB radiation  UVB radiation causes the most harmful effects and is more significant during the summer months. However, snow and ice can reflect UVB radiation leading to skin damage during the winter months as  well. UVB radiation is responsible for tanning, burning, inflammation, delayed erythema (pinkness), pigmentation (brown spots), and skin cancer.     I recommend self monthly full body exams and yearly full body exams with a dermatology provider. If you develop a new or changing lesion please follow up for examination. Most skin cancers are pink and scaly or pink and pearly. However, we do see blue/brown/black skin cancers.  Consider the ABCDEs of melanoma when giving yourself your monthly full body exam ( don't forget the groin, buttocks, feet, toes, etc). A-asymmetry, B-borders, C-color, D-diameter, E-elevation or evolving. If you see any of these changes please follow up in clinic. If you cannot see your back I recommend purchasing a hand held mirror to use with a larger wall mirror.       Checking for Skin Cancer  You can find cancer early by checking your skin each month. There are 3 kinds of skin cancer. They are melanoma, basal cell carcinoma, and squamous cell carcinoma. Doing monthly skin checks is the best way to find new marks or skin changes. Follow the instructions below for checking your skin.   The ABCDEs of checking moles for melanoma   Check your moles or growths for signs of melanoma using ABCDE:   Asymmetry: the sides of the mole or growth don t match  Border: the edges are ragged, notched, or blurred  Color: the color within the mole or growth varies  Diameter: the mole or growth is larger than 6 mm (size of a pencil eraser)  Evolving: the size, shape, or color of the mole or growth is changing (evolving is not shown in the images below)    Checking for other types of skin cancer  Basal cell carcinoma or squamous cell carcinoma have symptoms such as:     A spot or mole that looks different from all other marks on your skin  Changes in how an area feels, such as itching, tenderness, or pain  Changes in the skin's surface, such as oozing, bleeding, or scaliness  A sore that does not heal  New  swelling or redness beyond the border of a mole    Who s at risk?  Anyone can get skin cancer. But you are at greater risk if you have:   Fair skin, light-colored hair, or light-colored eyes  Many moles or abnormal moles on your skin  A history of sunburns from sunlight or tanning beds  A family history of skin cancer  A history of exposure to radiation or chemicals  A weakened immune system  If you have had skin cancer in the past, you are at risk for recurring skin cancer.   How to check your skin  Do your monthly skin checkups in front of a full-length mirror. Check all parts of your body, including your:   Head (ears, face, neck, and scalp)  Torso (front, back, and sides)  Arms (tops, undersides, upper, and lower armpits)  Hands (palms, backs, and fingers, including under the nails)  Buttocks and genitals  Legs (front, back, and sides)  Feet (tops, soles, toes, including under the nails, and between toes)  If you have a lot of moles, take digital photos of them each month. Make sure to take photos both up close and from a distance. These can help you see if any moles change over time.   Most skin changes are not cancer. But if you see any changes in your skin, call your doctor right away. Only he or she can diagnose a problem. If you have skin cancer, seeing your doctor can be the first step toward getting the treatment that could save your life.   [employer] last reviewed this educational content on 4/1/2019 2000-2020 [employer]. [address] [place] [address], [place] [address]. All rights reserved. This information is not intended as a substitute for professional medical care. Always follow your healthcare professional's instructions.       When should I call my doctor?  If you are worsening or not improving, please, contact us or seek urgent care as noted below.     Who should I call with questions (adults)?  [facility] (adult and pediatric): [phone]  [place]  [facility] (adult): [phone]  [facility] ([place], [place], [place] and [place]) [phone]  For urgent needs outside of business hours call the [facility] at [phone] and ask for the dermatology resident on call to be paged  If this is a medical emergency and you are unable to reach an ER, Call 911      If you need a prescription refill, please contact your pharmacy. Refills are approved or denied by our Physicians during normal business hours, Monday through Fridays  Per office policy, refills will not be granted if you have not been seen within the past year (or sooner depending on your child's condition)

## 2025-01-04 ENCOUNTER — HEALTH MAINTENANCE LETTER (OUTPATIENT)
Age: 70
End: 2025-01-04

## (undated) DEVICE — KIT HAND CONTROL ANGIOTOUCH ACIST 65CM AT-P65

## (undated) DEVICE — CATH ANGIO INFINITI PIGTAIL 145 6 SH 6FRX110CM  534-652S

## (undated) DEVICE — INTRO SHEATH 4FRX10CM PINNACLE RSS402

## (undated) DEVICE — TOTE ANGIO CORP PC15AT SAN32CC83O

## (undated) DEVICE — CATH ANGIO INFINITI 3DRC 4FRX100CM 538476

## (undated) DEVICE — DEFIB PRO-PADZ LVP LQD GEL ADULT 8900-2105-01

## (undated) DEVICE — CATH DIAG 4FR ANG PIG 538453S

## (undated) DEVICE — MANIFOLD KIT ANGIO AUTOMATED 014613

## (undated) DEVICE — CATH DIAG 4FR JL 4.5 538417

## (undated) DEVICE — NDL PERC ENTRY THINWALL 18GA 7.0" G00166

## (undated) RX ORDER — FENTANYL CITRATE 50 UG/ML
INJECTION, SOLUTION INTRAMUSCULAR; INTRAVENOUS
Status: DISPENSED
Start: 2017-07-28

## (undated) RX ORDER — ASPIRIN 81 MG/1
TABLET, CHEWABLE ORAL
Status: DISPENSED
Start: 2020-07-22

## (undated) RX ORDER — HEPARIN SODIUM 1000 [USP'U]/ML
INJECTION, SOLUTION INTRAVENOUS; SUBCUTANEOUS
Status: DISPENSED
Start: 2020-07-22

## (undated) RX ORDER — ACETAMINOPHEN 325 MG/1
TABLET ORAL
Status: DISPENSED
Start: 2017-07-28

## (undated) RX ORDER — NITROGLYCERIN 5 MG/ML
VIAL (ML) INTRAVENOUS
Status: DISPENSED
Start: 2020-07-22

## (undated) RX ORDER — FENTANYL CITRATE 50 UG/ML
INJECTION, SOLUTION INTRAMUSCULAR; INTRAVENOUS
Status: DISPENSED
Start: 2020-07-22

## (undated) RX ORDER — HEPARIN SODIUM 1000 [USP'U]/ML
INJECTION, SOLUTION INTRAVENOUS; SUBCUTANEOUS
Status: DISPENSED
Start: 2017-07-28

## (undated) RX ORDER — HEPARIN SODIUM 200 [USP'U]/100ML
INJECTION, SOLUTION INTRAVENOUS
Status: DISPENSED
Start: 2020-07-22

## (undated) RX ORDER — LIDOCAINE HYDROCHLORIDE 10 MG/ML
INJECTION, SOLUTION EPIDURAL; INFILTRATION; INTRACAUDAL; PERINEURAL
Status: DISPENSED
Start: 2020-07-22